# Patient Record
Sex: FEMALE | Race: OTHER | NOT HISPANIC OR LATINO | URBAN - METROPOLITAN AREA
[De-identification: names, ages, dates, MRNs, and addresses within clinical notes are randomized per-mention and may not be internally consistent; named-entity substitution may affect disease eponyms.]

---

## 2023-12-15 ENCOUNTER — APPOINTMENT (OUTPATIENT)
Dept: RADIOLOGY | Facility: HOSPITAL | Age: 63
End: 2023-12-15
Payer: COMMERCIAL

## 2024-05-22 ENCOUNTER — APPOINTMENT (OUTPATIENT)
Dept: RADIOLOGY | Facility: HOSPITAL | Age: 64
End: 2024-05-22
Payer: COMMERCIAL

## 2024-11-28 ENCOUNTER — HOSPITAL ENCOUNTER (INPATIENT)
Facility: HOSPITAL | Age: 64
LOS: 1 days | Discharge: HOME | End: 2024-11-29
Attending: EMERGENCY MEDICINE | Admitting: STUDENT IN AN ORGANIZED HEALTH CARE EDUCATION/TRAINING PROGRAM
Payer: COMMERCIAL

## 2024-11-28 ENCOUNTER — APPOINTMENT (OUTPATIENT)
Dept: CARDIOLOGY | Facility: HOSPITAL | Age: 64
End: 2024-11-28
Payer: COMMERCIAL

## 2024-11-28 DIAGNOSIS — I31.9 PERICARDITIS, UNSPECIFIED CHRONICITY, UNSPECIFIED TYPE (HHS-HCC): ICD-10-CM

## 2024-11-28 DIAGNOSIS — I31.39 PERICARDIAL EFFUSION (HHS-HCC): Primary | ICD-10-CM

## 2024-11-28 DIAGNOSIS — I30.9 ACUTE PERICARDITIS, UNSPECIFIED TYPE (HHS-HCC): ICD-10-CM

## 2024-11-28 LAB
ALBUMIN SERPL BCP-MCNC: 3.7 G/DL (ref 3.4–5)
ALP SERPL-CCNC: 88 U/L (ref 33–136)
ALT SERPL W P-5'-P-CCNC: 7 U/L (ref 7–45)
ANION GAP SERPL CALC-SCNC: 13 MMOL/L (ref 10–20)
AST SERPL W P-5'-P-CCNC: 9 U/L (ref 9–39)
BASOPHILS # BLD AUTO: 0.06 X10*3/UL (ref 0–0.1)
BASOPHILS NFR BLD AUTO: 0.7 %
BILIRUB SERPL-MCNC: 0.4 MG/DL (ref 0–1.2)
BNP SERPL-MCNC: 26 PG/ML (ref 0–99)
BUN SERPL-MCNC: 9 MG/DL (ref 6–23)
CALCIUM SERPL-MCNC: 9.3 MG/DL (ref 8.6–10.3)
CARDIAC TROPONIN I PNL SERPL HS: <3 NG/L (ref 0–13)
CARDIAC TROPONIN I PNL SERPL HS: <3 NG/L (ref 0–13)
CHLORIDE SERPL-SCNC: 102 MMOL/L (ref 98–107)
CO2 SERPL-SCNC: 23 MMOL/L (ref 21–32)
CREAT SERPL-MCNC: 0.56 MG/DL (ref 0.5–1.05)
D DIMER PPP FEU-MCNC: 4969 NG/ML FEU
EGFRCR SERPLBLD CKD-EPI 2021: >90 ML/MIN/1.73M*2
EOSINOPHIL # BLD AUTO: 0.22 X10*3/UL (ref 0–0.7)
EOSINOPHIL NFR BLD AUTO: 2.7 %
ERYTHROCYTE [DISTWIDTH] IN BLOOD BY AUTOMATED COUNT: 18.1 % (ref 11.5–14.5)
GLUCOSE SERPL-MCNC: 97 MG/DL (ref 74–99)
HCT VFR BLD AUTO: 28.1 % (ref 36–46)
HGB BLD-MCNC: 8.3 G/DL (ref 12–16)
IMM GRANULOCYTES # BLD AUTO: 0.04 X10*3/UL (ref 0–0.7)
IMM GRANULOCYTES NFR BLD AUTO: 0.5 % (ref 0–0.9)
INR PPP: 1.5 (ref 0.9–1.1)
LYMPHOCYTES # BLD AUTO: 1.52 X10*3/UL (ref 1.2–4.8)
LYMPHOCYTES NFR BLD AUTO: 18.4 %
MCH RBC QN AUTO: 21.2 PG (ref 26–34)
MCHC RBC AUTO-ENTMCNC: 29.5 G/DL (ref 32–36)
MCV RBC AUTO: 72 FL (ref 80–100)
MONOCYTES # BLD AUTO: 0.47 X10*3/UL (ref 0.1–1)
MONOCYTES NFR BLD AUTO: 5.7 %
NEUTROPHILS # BLD AUTO: 5.94 X10*3/UL (ref 1.2–7.7)
NEUTROPHILS NFR BLD AUTO: 72 %
NRBC BLD-RTO: 0 /100 WBCS (ref 0–0)
PLATELET # BLD AUTO: 370 X10*3/UL (ref 150–450)
POTASSIUM SERPL-SCNC: 3.7 MMOL/L (ref 3.5–5.3)
PROT SERPL-MCNC: 7.8 G/DL (ref 6.4–8.2)
PROTHROMBIN TIME: 16.9 SECONDS (ref 9.8–12.8)
RBC # BLD AUTO: 3.91 X10*6/UL (ref 4–5.2)
SODIUM SERPL-SCNC: 134 MMOL/L (ref 136–145)
WBC # BLD AUTO: 8.3 X10*3/UL (ref 4.4–11.3)

## 2024-11-28 PROCEDURE — 83880 ASSAY OF NATRIURETIC PEPTIDE: CPT | Performed by: EMERGENCY MEDICINE

## 2024-11-28 PROCEDURE — 99291 CRITICAL CARE FIRST HOUR: CPT | Performed by: STUDENT IN AN ORGANIZED HEALTH CARE EDUCATION/TRAINING PROGRAM

## 2024-11-28 PROCEDURE — 36415 COLL VENOUS BLD VENIPUNCTURE: CPT | Performed by: EMERGENCY MEDICINE

## 2024-11-28 PROCEDURE — 93005 ELECTROCARDIOGRAM TRACING: CPT

## 2024-11-28 PROCEDURE — 93010 ELECTROCARDIOGRAM REPORT: CPT | Performed by: EMERGENCY MEDICINE

## 2024-11-28 PROCEDURE — 85610 PROTHROMBIN TIME: CPT | Performed by: EMERGENCY MEDICINE

## 2024-11-28 PROCEDURE — 84484 ASSAY OF TROPONIN QUANT: CPT | Performed by: EMERGENCY MEDICINE

## 2024-11-28 PROCEDURE — 80053 COMPREHEN METABOLIC PANEL: CPT | Performed by: EMERGENCY MEDICINE

## 2024-11-28 PROCEDURE — 85025 COMPLETE CBC W/AUTO DIFF WBC: CPT | Performed by: EMERGENCY MEDICINE

## 2024-11-28 PROCEDURE — 85379 FIBRIN DEGRADATION QUANT: CPT

## 2024-11-28 PROCEDURE — 93308 TTE F-UP OR LMTD: CPT | Performed by: STUDENT IN AN ORGANIZED HEALTH CARE EDUCATION/TRAINING PROGRAM

## 2024-11-28 RX ORDER — DIPHENHYDRAMINE HYDROCHLORIDE 50 MG/ML
50 INJECTION INTRAMUSCULAR; INTRAVENOUS ONCE
Status: COMPLETED | OUTPATIENT
Start: 2024-11-29 | End: 2024-11-29

## 2024-11-28 ASSESSMENT — COLUMBIA-SUICIDE SEVERITY RATING SCALE - C-SSRS
1. IN THE PAST MONTH, HAVE YOU WISHED YOU WERE DEAD OR WISHED YOU COULD GO TO SLEEP AND NOT WAKE UP?: NO
2. HAVE YOU ACTUALLY HAD ANY THOUGHTS OF KILLING YOURSELF?: NO
6. HAVE YOU EVER DONE ANYTHING, STARTED TO DO ANYTHING, OR PREPARED TO DO ANYTHING TO END YOUR LIFE?: NO

## 2024-11-28 ASSESSMENT — PAIN DESCRIPTION - PAIN TYPE: TYPE: ACUTE PAIN

## 2024-11-28 ASSESSMENT — PAIN DESCRIPTION - LOCATION: LOCATION: CHEST

## 2024-11-28 ASSESSMENT — PAIN SCALES - GENERAL: PAINLEVEL_OUTOF10: 10 - WORST POSSIBLE PAIN

## 2024-11-28 ASSESSMENT — PAIN - FUNCTIONAL ASSESSMENT: PAIN_FUNCTIONAL_ASSESSMENT: 0-10

## 2024-11-29 ENCOUNTER — APPOINTMENT (OUTPATIENT)
Dept: RADIOLOGY | Facility: HOSPITAL | Age: 64
End: 2024-11-29
Payer: COMMERCIAL

## 2024-11-29 ENCOUNTER — APPOINTMENT (OUTPATIENT)
Dept: CARDIOLOGY | Facility: HOSPITAL | Age: 64
End: 2024-11-29
Payer: COMMERCIAL

## 2024-11-29 VITALS
TEMPERATURE: 97.7 F | BODY MASS INDEX: 44.65 KG/M2 | DIASTOLIC BLOOD PRESSURE: 82 MMHG | HEART RATE: 86 BPM | HEIGHT: 63 IN | WEIGHT: 252 LBS | OXYGEN SATURATION: 95 % | RESPIRATION RATE: 16 BRPM | SYSTOLIC BLOOD PRESSURE: 144 MMHG

## 2024-11-29 DIAGNOSIS — I31.39 PERICARDIAL EFFUSION (HHS-HCC): Primary | ICD-10-CM

## 2024-11-29 PROBLEM — I31.9 PERICARDITIS (HHS-HCC): Status: ACTIVE | Noted: 2024-11-29

## 2024-11-29 LAB
AORTIC VALVE PEAK VELOCITY: 1.6 M/S
AV PEAK GRADIENT: 10 MMHG
AVA (PEAK VEL): 2.16 CM2
CRP SERPL-MCNC: 13.87 MG/DL
EJECTION FRACTION APICAL 4 CHAMBER: 55.4
EJECTION FRACTION: 60 %
ERYTHROCYTE [DISTWIDTH] IN BLOOD BY AUTOMATED COUNT: 18.1 % (ref 11.5–14.5)
FERRITIN SERPL-MCNC: 55 NG/ML (ref 8–150)
HCT VFR BLD AUTO: 29.9 % (ref 36–46)
HGB BLD-MCNC: 8.7 G/DL (ref 12–16)
IRON SATN MFR SERPL: 4 % (ref 25–45)
IRON SERPL-MCNC: 14 UG/DL (ref 35–150)
LEFT VENTRICLE INTERNAL DIMENSION DIASTOLE: 3.36 CM (ref 3.5–6)
LEFT VENTRICULAR OUTFLOW TRACT DIAMETER: 2 CM
LV EJECTION FRACTION BIPLANE: 54 %
MCH RBC QN AUTO: 21.2 PG (ref 26–34)
MCHC RBC AUTO-ENTMCNC: 29.1 G/DL (ref 32–36)
MCV RBC AUTO: 73 FL (ref 80–100)
MITRAL VALVE E/A RATIO: 0.73
MITRAL VALVE E/E' RATIO: 6.3
NRBC BLD-RTO: 0 /100 WBCS (ref 0–0)
PLATELET # BLD AUTO: 372 X10*3/UL (ref 150–450)
RBC # BLD AUTO: 4.11 X10*6/UL (ref 4–5.2)
RIGHT VENTRICLE FREE WALL PEAK S': 11 CM/S
TIBC SERPL-MCNC: 371 UG/DL (ref 240–445)
TRICUSPID ANNULAR PLANE SYSTOLIC EXCURSION: 2 CM
UIBC SERPL-MCNC: 357 UG/DL (ref 110–370)
WBC # BLD AUTO: 8 X10*3/UL (ref 4.4–11.3)

## 2024-11-29 PROCEDURE — 71275 CT ANGIOGRAPHY CHEST: CPT

## 2024-11-29 PROCEDURE — 86038 ANTINUCLEAR ANTIBODIES: CPT | Mod: STJLAB

## 2024-11-29 PROCEDURE — G0378 HOSPITAL OBSERVATION PER HR: HCPCS

## 2024-11-29 PROCEDURE — 99255 IP/OBS CONSLTJ NEW/EST HI 80: CPT

## 2024-11-29 PROCEDURE — 83550 IRON BINDING TEST: CPT | Performed by: STUDENT IN AN ORGANIZED HEALTH CARE EDUCATION/TRAINING PROGRAM

## 2024-11-29 PROCEDURE — 96376 TX/PRO/DX INJ SAME DRUG ADON: CPT | Mod: 59

## 2024-11-29 PROCEDURE — 93306 TTE W/DOPPLER COMPLETE: CPT | Performed by: INTERNAL MEDICINE

## 2024-11-29 PROCEDURE — 82728 ASSAY OF FERRITIN: CPT | Performed by: STUDENT IN AN ORGANIZED HEALTH CARE EDUCATION/TRAINING PROGRAM

## 2024-11-29 PROCEDURE — 93306 TTE W/DOPPLER COMPLETE: CPT

## 2024-11-29 PROCEDURE — 2060000001 HC INTERMEDIATE ICU ROOM DAILY

## 2024-11-29 PROCEDURE — 36415 COLL VENOUS BLD VENIPUNCTURE: CPT

## 2024-11-29 PROCEDURE — 2500000004 HC RX 250 GENERAL PHARMACY W/ HCPCS (ALT 636 FOR OP/ED)

## 2024-11-29 PROCEDURE — 85027 COMPLETE CBC AUTOMATED: CPT | Performed by: STUDENT IN AN ORGANIZED HEALTH CARE EDUCATION/TRAINING PROGRAM

## 2024-11-29 PROCEDURE — 83540 ASSAY OF IRON: CPT | Performed by: STUDENT IN AN ORGANIZED HEALTH CARE EDUCATION/TRAINING PROGRAM

## 2024-11-29 PROCEDURE — 86140 C-REACTIVE PROTEIN: CPT

## 2024-11-29 PROCEDURE — 96375 TX/PRO/DX INJ NEW DRUG ADDON: CPT

## 2024-11-29 PROCEDURE — 71275 CT ANGIOGRAPHY CHEST: CPT | Performed by: STUDENT IN AN ORGANIZED HEALTH CARE EDUCATION/TRAINING PROGRAM

## 2024-11-29 PROCEDURE — 86235 NUCLEAR ANTIGEN ANTIBODY: CPT

## 2024-11-29 PROCEDURE — 2500000005 HC RX 250 GENERAL PHARMACY W/O HCPCS: Performed by: STUDENT IN AN ORGANIZED HEALTH CARE EDUCATION/TRAINING PROGRAM

## 2024-11-29 PROCEDURE — 2550000001 HC RX 255 CONTRASTS: Performed by: STUDENT IN AN ORGANIZED HEALTH CARE EDUCATION/TRAINING PROGRAM

## 2024-11-29 PROCEDURE — 99236 HOSP IP/OBS SAME DATE HI 85: CPT | Performed by: STUDENT IN AN ORGANIZED HEALTH CARE EDUCATION/TRAINING PROGRAM

## 2024-11-29 PROCEDURE — 99291 CRITICAL CARE FIRST HOUR: CPT | Mod: 25 | Performed by: STUDENT IN AN ORGANIZED HEALTH CARE EDUCATION/TRAINING PROGRAM

## 2024-11-29 PROCEDURE — 96374 THER/PROPH/DIAG INJ IV PUSH: CPT | Mod: 59

## 2024-11-29 RX ORDER — AMLODIPINE BESYLATE 10 MG/1
10 TABLET ORAL DAILY
COMMUNITY

## 2024-11-29 RX ORDER — DOXEPIN HYDROCHLORIDE 50 MG/G
1 CREAM TOPICAL 2 TIMES DAILY
COMMUNITY

## 2024-11-29 RX ORDER — HYDROXYZINE HYDROCHLORIDE 50 MG/1
50 TABLET, FILM COATED ORAL EVERY 8 HOURS PRN
COMMUNITY

## 2024-11-29 RX ORDER — LORAZEPAM 1 MG/1
1 TABLET ORAL EVERY 12 HOURS
COMMUNITY

## 2024-11-29 RX ORDER — CROMOLYN SODIUM 40 MG/ML
1 SOLUTION/ DROPS OPHTHALMIC 4 TIMES DAILY
COMMUNITY

## 2024-11-29 RX ORDER — POTASSIUM CHLORIDE 750 MG/1
10 TABLET, FILM COATED, EXTENDED RELEASE ORAL 2 TIMES DAILY
COMMUNITY

## 2024-11-29 RX ORDER — FERROUS SULFATE, DRIED 160(50) MG
1 TABLET, EXTENDED RELEASE ORAL DAILY
COMMUNITY

## 2024-11-29 RX ORDER — COLCHICINE 0.6 MG/1
0.6 TABLET ORAL 2 TIMES DAILY
Status: DISCONTINUED | OUTPATIENT
Start: 2024-11-29 | End: 2024-11-29 | Stop reason: HOSPADM

## 2024-11-29 RX ORDER — LORATADINE 10 MG/1
10 TABLET ORAL DAILY
COMMUNITY

## 2024-11-29 RX ORDER — FOLIC ACID 1 MG/1
2 TABLET ORAL DAILY
COMMUNITY

## 2024-11-29 RX ORDER — DULOXETIN HYDROCHLORIDE 30 MG/1
30 CAPSULE, DELAYED RELEASE ORAL 2 TIMES DAILY
COMMUNITY

## 2024-11-29 RX ORDER — ACETAMINOPHEN 500 MG
5000 TABLET ORAL DAILY
COMMUNITY

## 2024-11-29 RX ORDER — DIPHENHYDRAMINE HCL 25 MG
25 CAPSULE ORAL EVERY 8 HOURS PRN
COMMUNITY

## 2024-11-29 RX ORDER — MELOXICAM 15 MG/1
15 TABLET ORAL DAILY
COMMUNITY
End: 2024-11-29 | Stop reason: HOSPADM

## 2024-11-29 RX ORDER — POLYETHYLENE GLYCOL 3350 17 G/17G
17 POWDER, FOR SOLUTION ORAL DAILY
Status: DISCONTINUED | OUTPATIENT
Start: 2024-11-29 | End: 2024-11-29 | Stop reason: HOSPADM

## 2024-11-29 RX ORDER — FUROSEMIDE 40 MG/1
40 TABLET ORAL DAILY
COMMUNITY
End: 2024-11-29 | Stop reason: HOSPADM

## 2024-11-29 RX ORDER — COLCHICINE 0.6 MG/1
0.6 TABLET ORAL 2 TIMES DAILY
Qty: 60 TABLET | Refills: 2 | Status: SHIPPED | OUTPATIENT
Start: 2024-11-29 | End: 2025-02-27

## 2024-11-29 RX ORDER — IRON POLYSACCHARIDE COMPLEX 180 MG
1 CAPSULE ORAL DAILY
COMMUNITY

## 2024-11-29 SDOH — SOCIAL STABILITY: SOCIAL INSECURITY: ARE THERE ANY APPARENT SIGNS OF INJURIES/BEHAVIORS THAT COULD BE RELATED TO ABUSE/NEGLECT?: UNABLE TO ASSESS

## 2024-11-29 SDOH — ECONOMIC STABILITY: HOUSING INSECURITY: IN THE PAST 12 MONTHS, HOW MANY TIMES HAVE YOU MOVED WHERE YOU WERE LIVING?: 0

## 2024-11-29 SDOH — SOCIAL STABILITY: SOCIAL INSECURITY: HAS ANYONE EVER THREATENED TO HURT YOUR FAMILY OR YOUR PETS?: UNABLE TO ASSESS

## 2024-11-29 SDOH — SOCIAL STABILITY: SOCIAL INSECURITY: DOES ANYONE TRY TO KEEP YOU FROM HAVING/CONTACTING OTHER FRIENDS OR DOING THINGS OUTSIDE YOUR HOME?: UNABLE TO ASSESS

## 2024-11-29 SDOH — SOCIAL STABILITY: SOCIAL INSECURITY: DO YOU FEEL ANYONE HAS EXPLOITED OR TAKEN ADVANTAGE OF YOU FINANCIALLY OR OF YOUR PERSONAL PROPERTY?: UNABLE TO ASSESS

## 2024-11-29 SDOH — SOCIAL STABILITY: SOCIAL INSECURITY
WITHIN THE LAST YEAR, HAVE YOU BEEN HUMILIATED OR EMOTIONALLY ABUSED IN OTHER WAYS BY YOUR PARTNER OR EX-PARTNER?: PATIENT DECLINED

## 2024-11-29 SDOH — SOCIAL STABILITY: SOCIAL INSECURITY: ABUSE: ADULT

## 2024-11-29 SDOH — ECONOMIC STABILITY: HOUSING INSECURITY: AT ANY TIME IN THE PAST 12 MONTHS, WERE YOU HOMELESS OR LIVING IN A SHELTER (INCLUDING NOW)?: PATIENT DECLINED

## 2024-11-29 SDOH — SOCIAL STABILITY: SOCIAL INSECURITY
WITHIN THE LAST YEAR, HAVE YOU BEEN KICKED, HIT, SLAPPED, OR OTHERWISE PHYSICALLY HURT BY YOUR PARTNER OR EX-PARTNER?: PATIENT DECLINED

## 2024-11-29 SDOH — SOCIAL STABILITY: SOCIAL INSECURITY: DO YOU FEEL UNSAFE GOING BACK TO THE PLACE WHERE YOU ARE LIVING?: UNABLE TO ASSESS

## 2024-11-29 SDOH — ECONOMIC STABILITY: FOOD INSECURITY: HOW HARD IS IT FOR YOU TO PAY FOR THE VERY BASICS LIKE FOOD, HOUSING, MEDICAL CARE, AND HEATING?: PATIENT DECLINED

## 2024-11-29 SDOH — SOCIAL STABILITY: SOCIAL INSECURITY: HAVE YOU HAD THOUGHTS OF HARMING ANYONE ELSE?: NO

## 2024-11-29 SDOH — ECONOMIC STABILITY: TRANSPORTATION INSECURITY
IN THE PAST 12 MONTHS, HAS LACK OF TRANSPORTATION KEPT YOU FROM MEDICAL APPOINTMENTS OR FROM GETTING MEDICATIONS?: PATIENT DECLINED

## 2024-11-29 SDOH — ECONOMIC STABILITY: HOUSING INSECURITY: IN THE LAST 12 MONTHS, WAS THERE A TIME WHEN YOU WERE NOT ABLE TO PAY THE MORTGAGE OR RENT ON TIME?: PATIENT DECLINED

## 2024-11-29 SDOH — SOCIAL STABILITY: SOCIAL INSECURITY: WITHIN THE LAST YEAR, HAVE YOU BEEN AFRAID OF YOUR PARTNER OR EX-PARTNER?: PATIENT DECLINED

## 2024-11-29 SDOH — ECONOMIC STABILITY: FOOD INSECURITY: WITHIN THE PAST 12 MONTHS, THE FOOD YOU BOUGHT JUST DIDN'T LAST AND YOU DIDN'T HAVE MONEY TO GET MORE.: PATIENT DECLINED

## 2024-11-29 SDOH — ECONOMIC STABILITY: INCOME INSECURITY
IN THE PAST 12 MONTHS HAS THE ELECTRIC, GAS, OIL, OR WATER COMPANY THREATENED TO SHUT OFF SERVICES IN YOUR HOME?: PATIENT DECLINED

## 2024-11-29 SDOH — SOCIAL STABILITY: SOCIAL INSECURITY: ARE YOU OR HAVE YOU BEEN THREATENED OR ABUSED PHYSICALLY, EMOTIONALLY, OR SEXUALLY BY ANYONE?: UNABLE TO ASSESS

## 2024-11-29 SDOH — ECONOMIC STABILITY: FOOD INSECURITY
WITHIN THE PAST 12 MONTHS, YOU WORRIED THAT YOUR FOOD WOULD RUN OUT BEFORE YOU GOT THE MONEY TO BUY MORE.: PATIENT DECLINED

## 2024-11-29 SDOH — SOCIAL STABILITY: SOCIAL INSECURITY
WITHIN THE LAST YEAR, HAVE YOU BEEN RAPED OR FORCED TO HAVE ANY KIND OF SEXUAL ACTIVITY BY YOUR PARTNER OR EX-PARTNER?: PATIENT DECLINED

## 2024-11-29 ASSESSMENT — LIFESTYLE VARIABLES
SKIP TO QUESTIONS 9-10: 0
TOTAL SCORE: 0
AUDIT-C TOTAL SCORE: -1
HOW OFTEN DO YOU HAVE 6 OR MORE DRINKS ON ONE OCCASION: PATIENT DECLINED
EVER HAD A DRINK FIRST THING IN THE MORNING TO STEADY YOUR NERVES TO GET RID OF A HANGOVER: NO
EVER FELT BAD OR GUILTY ABOUT YOUR DRINKING: NO
HAVE YOU EVER FELT YOU SHOULD CUT DOWN ON YOUR DRINKING: NO
HOW OFTEN DO YOU HAVE A DRINK CONTAINING ALCOHOL: PATIENT DECLINED
HOW MANY STANDARD DRINKS CONTAINING ALCOHOL DO YOU HAVE ON A TYPICAL DAY: PATIENT DECLINED
HAVE PEOPLE ANNOYED YOU BY CRITICIZING YOUR DRINKING: NO
AUDIT-C TOTAL SCORE: -1

## 2024-11-29 ASSESSMENT — COGNITIVE AND FUNCTIONAL STATUS - GENERAL
WALKING IN HOSPITAL ROOM: A LITTLE
PATIENT BASELINE BEDBOUND: NO
MOBILITY SCORE: 18
DAILY ACTIVITIY SCORE: 24
STANDING UP FROM CHAIR USING ARMS: A LITTLE
CLIMB 3 TO 5 STEPS WITH RAILING: A LITTLE
MOVING FROM LYING ON BACK TO SITTING ON SIDE OF FLAT BED WITH BEDRAILS: A LITTLE
MOVING TO AND FROM BED TO CHAIR: A LITTLE
TURNING FROM BACK TO SIDE WHILE IN FLAT BAD: A LITTLE

## 2024-11-29 ASSESSMENT — PATIENT HEALTH QUESTIONNAIRE - PHQ9
2. FEELING DOWN, DEPRESSED OR HOPELESS: NOT AT ALL
1. LITTLE INTEREST OR PLEASURE IN DOING THINGS: NOT AT ALL
SUM OF ALL RESPONSES TO PHQ9 QUESTIONS 1 & 2: 0

## 2024-11-29 ASSESSMENT — ENCOUNTER SYMPTOMS
NAUSEA: 0
HEMATOLOGIC/LYMPHATIC NEGATIVE: 1
PALPITATIONS: 0
PSYCHIATRIC NEGATIVE: 1
EYES NEGATIVE: 1
ALLERGIC/IMMUNOLOGIC NEGATIVE: 1
SHORTNESS OF BREATH: 1
DIZZINESS: 1
CONSTITUTIONAL NEGATIVE: 1
ENDOCRINE NEGATIVE: 1
VOMITING: 0
GASTROINTESTINAL NEGATIVE: 1
MUSCULOSKELETAL NEGATIVE: 1
ABDOMINAL PAIN: 0

## 2024-11-29 ASSESSMENT — ACTIVITIES OF DAILY LIVING (ADL)
GROOMING: INDEPENDENT
LACK_OF_TRANSPORTATION: PATIENT DECLINED
FEEDING YOURSELF: INDEPENDENT
HEARING - LEFT EAR: FUNCTIONAL
TOILETING: INDEPENDENT
JUDGMENT_ADEQUATE_SAFELY_COMPLETE_DAILY_ACTIVITIES: YES
PATIENT'S MEMORY ADEQUATE TO SAFELY COMPLETE DAILY ACTIVITIES?: YES
DRESSING YOURSELF: INDEPENDENT
BATHING: INDEPENDENT
LACK_OF_TRANSPORTATION: PATIENT DECLINED
HEARING - RIGHT EAR: FUNCTIONAL
WALKS IN HOME: INDEPENDENT
ADEQUATE_TO_COMPLETE_ADL: YES

## 2024-11-29 ASSESSMENT — PAIN SCALES - GENERAL
PAINLEVEL_OUTOF10: 0 - NO PAIN

## 2024-11-29 ASSESSMENT — PAIN - FUNCTIONAL ASSESSMENT: PAIN_FUNCTIONAL_ASSESSMENT: 0-10

## 2024-11-29 NOTE — ED PROCEDURE NOTE
Procedure  Critical Care    Performed by: Michael Paredes MD  Authorized by: Michael Paredes MD    Critical care provider statement:     Critical care time (minutes):  35    Critical care time was exclusive of:  Separately billable procedures and treating other patients and teaching time    Critical care was necessary to treat or prevent imminent or life-threatening deterioration of the following conditions:  Cardiac failure    Critical care was time spent personally by me on the following activities:  Development of treatment plan with patient or surrogate, discussions with consultants, evaluation of patient's response to treatment, interpretation of cardiac output measurements, ordering and performing treatments and interventions, ordering and review of laboratory studies, ordering and review of radiographic studies, pulse oximetry and re-evaluation of patient's condition    Care discussed with: admitting provider                 Michael Paredes MD  11/29/24 0599

## 2024-11-29 NOTE — ED TRIAGE NOTES
Patient has had chest pain for a couple days now. She woke up with left shoulder pain and left hand swelling. She states she took 2 of her daughters Coumadin due to the chest pain. Pt is not prescribed Coumadin.

## 2024-11-29 NOTE — HOSPITAL COURSE
64-year-old female with PMHx of RA, lupus, CAD, distant history of cardiac cath/PCI, VLAD, MDD, nicotine use disorder, GERD, HTN, obesity, meningioma s/p surgical resection in August 2024 at the [facility] who presented to [facility]'s ED complaining of exertional chest pain, dyspnea for the last 4 to 5 days.  Patient did not have any formal diagnosis of COPD but she is a daily smoker.    Of note, she went to the [facility] ED on 9/22/2024 complaining of bilateral lower extremity swelling which started after her meningioma resection R>L as well as significant insomnia after the operation    ED course:   VS: /77, HR 98, RR 20, afebrile 37, RR 20, SpO2 94% on RA.  She became hypoxic tachypneic afterwards to the mid 80s and was placed on O2 via NC.  Labs: CBC with Hgb 8.3, HCT 28.1, MCV 72, no leukocytosis.  Chemistry is unremarkable except for sodium 134.  Negative troponin, normal BNP 26.  Elevated D-dimer 4969.  INR 1.5, PT 16.9.  Imaging: CT angio chest revealed no PE but large pericardial effusion concerning for early cardiac tamponade, aneurysmal ascending thoracic aorta measuring up to 3.4 cm, atelectatic changes without consolidation (CT chest took a long time considering patient has contrast allergy).  POC ultrasound did not show clear evidence of cardiac tamponade such as diastolic right ventricular collapse or systolic right atrial collapse as well as patient was not hypotensive.    Cardiology was consulted and recommended a stat echocardiogram and if there is a tamponade, interventional cardiology need to be consulted for either pericardiocentesis or pericardial window.  Clear instructions of not giving Lasix under any circumstances.    Assessment and plan:    # Exertional chest pain and dyspnea with large pericardial effusion  # Unconfirmed history of SLE and RA  # Chronic microcytic anemia  # Postoperative bilateral lower extremity swelling  # Nicotine use disorder  # Hypertension  # Obesity  #  Contrast allergy    Likely contributing factors include lupus associated serositis, possible postoperative inflammatory response or progression of CAD.  No clear evidence of tamponade currently, thus the stat echocardiogram.  Risk of overlapping autoimmune driven serositis or vasculitis in case of SLE and RA.  As far as her anemia is likely iron deficiency exacerbated by chronic disease and possible blood loss?  For the bilateral lower extremity swelling, the differential include postsurgical venous insufficiency versus lymphedema versus DVT (negative PE on CT.  Also chronic smoking history is definitely contributing to dyspnea and atelectasis.  HTN contributes to increased cardiovascular risk.    Plan:    -Admitted to stepdown on telemetry  -Monitor vitally and hemodynamically especially signs for tamponade (hypotension, pulses paradoxus, JVD)  -Cardiology consulted and aware of the situation  -Follow-up stat echocardiogram  -Repeat EKG to evaluate for electrical alternate or other abnormalities  -In case of tamponade, consult interventional cardiology for pericardiocentesis or surgical pericardial window  -Avoid diuretics (risk of hemodynamic compromise) especially Lasix  -Consider autoimmune flare versus infection if SLE related thyroiditis suspected and consider sending JOVANNA, dsDNA, complements level C3/C4, inflammatory markers CRP/ESR.  -Consider iron studies for anemia  -For bilateral lower extremity edema, please consider Doppler ultrasound  -No pharmaceutical DVT PPx considering possible intervention in case of tamponade.  Please revisit if no intervention or if DVT is suspected.  Currently on SCDs  -NRT    # For VLAD/MDD  Continue home medication once med rec is done.

## 2024-11-29 NOTE — PROGRESS NOTES
Please schedule outpatient limited pericardial effusion in 2-3 weeks.  RTO with me preferably same day.    SDH

## 2024-11-29 NOTE — DISCHARGE INSTRUCTIONS
Please follow up with your primary care provider within 7 days for hospital follow up. Please call to make this appointment.    Please follow-up with cardiology Dr. Arechiga regarding repeating echocardiogram within 2 to 4 weeks    Please take your medications as prescribed.     Please take colchicine twice daily for 3 months    If you have any new or worsening symptoms seek medical attention.    Thank you for allowing us to participate in your care!    -[facility] [place] [facility].

## 2024-11-29 NOTE — PROGRESS NOTES
11/29/24 1232   Discharge Planning   Living Arrangements Children  (Lives with 2 daughters)   Support Systems Children;Family members   Assistance Needed uses a cane to ambulate   Type of Residence Private residence   Who is requesting discharge planning? Provider   Home or Post Acute Services None   Expected Discharge Disposition Home   Does the patient need discharge transport arranged? No   Intensity of Service   Intensity of Service 0-30 min     Called and spoke with patient's son, Vi. Introduced self and role in hospital. Verified address and PCP is Dr. Venus Herring. Uses [facility] in [place] for medications and has no issues obtaining them. Uses a cane to ambulate, no driving, and is mostly independent with ADL's. Son states that on good days, patient does well ambulating but has very bad arthritis especially in knee. Patient will return home on discharge with no needs. CT team to follow patient for discharge needs.

## 2024-11-29 NOTE — H&P
Internal Medicine    Admission H&P      Patient Marylou Cordero PCP Mily Bassett MD    MRN [id number]  Admission Date 11/28/2024      Chief Complaint/Reason for Admission:  Marylou is a 64 y.o. female who presented today with chest pain and exertional dyspnea.    Subjective    Subjective   History of Presenting Illness  Marylou is a 64-year-old female with PMHx of RA, lupus, CAD, distant history of cardiac cath/PCI, VLAD, MDD, nicotine use disorder, GERD, HTN, obesity, meningioma s/p surgical resection in August 2024 at the [facility] who presented to [facility]'s ED complaining of chest pain and exertional dyspnea for the last 4 to 5 days.  History was taken from the patient and her son at bedside.  Patient reported that the exertional dyspnea in addition to difficulty when sleeping flat has been presents with loss 2 to 3 weeks which was also accompanied by dry cough.  And over the last couple days she started having sharp stabbing chest pain that exaggerates with breathing with no specific factors that makes it better. The patient son also reported that she had multiple episodes of choking while asleep.  Patient reported that over the same duration she started noticing swelling in her lower extremities as well as upper extremities.  Patient did not have any formal diagnosis of COPD but she is a daily smoker.  Otherwise, patient denied having fever, chills, abdominal pain, recent change in bowel habits, or urinary symptoms.  However she does endorse having some shortness of breath with chest pain that improved compared to initial presentation.  She does also report having some dizziness.    Of note, she went to the [facility] ED on 9/22/2024 complaining of bilateral lower extremity swelling which started after her meningioma resection R>L as well as significant insomnia after the operation    ED course:   VS: /77, HR 98, RR 20, afebrile 37, RR 20, SpO2 94% on RA.  She became hypoxic tachypneic afterwards to  the mid 80s and was placed on O2 via NC.  Labs: CBC with Hgb 8.3, HCT 28.1, MCV 72, no leukocytosis.  Chemistry is unremarkable except for sodium 134.  Negative troponin, normal BNP 26.  Elevated D-dimer 4969.  INR 1.5, PT 16.9.  Imaging: CT angio chest revealed no PE but large pericardial effusion concerning for early cardiac tamponade, aneurysmal ascending thoracic aorta measuring up to 4.3 cm, atelectatic changes without consolidation (CT chest took a long time considering patient has contrast allergy).  Intervention: POC ultrasound did not show clear evidence of cardiac tamponade such as diastolic right ventricular collapse or systolic right atrial collapse as well as patient was not hypotensive.    Cardiology was consulted and recommended a stat echocardiogram and if there is a tamponade, interventional cardiology need to be consulted for either pericardiocentesis or pericardial window.  Clear instructions of not giving Lasix under any circumstances.    Past Medical History  Active Ambulatory Problems     Diagnosis Date Noted    No Active Ambulatory Problems     Resolved Ambulatory Problems     Diagnosis Date Noted    No Resolved Ambulatory Problems     No Additional Past Medical History       Past Surgical History   Past Surgical History:   Procedure Laterality Date    OTHER SURGICAL HISTORY  01/12/2016    Incision Of Perianal Abscess      Social History  Social History     Socioeconomic History    Marital status:      Spouse name: Not on file    Number of children: Not on file    Years of education: Not on file    Highest education level: Not on file   Occupational History    Not on file   Tobacco Use    Smoking status: Every Day     Types: Cigarettes    Smokeless tobacco: Never   Substance and Sexual Activity    Alcohol use: Not on file    Drug use: Never    Sexual activity: Not on file   Other Topics Concern    Not on file   Social History Narrative    Not on file     Social Drivers of Health      Financial Resource Strain: Patient Declined (11/29/2024)    Overall Financial Resource Strain (CARDIA)     Difficulty of Paying Living Expenses: Patient declined   Food Insecurity: Patient Declined (11/29/2024)    Hunger Vital Sign     Worried About Running Out of Food in the Last Year: Patient declined     Ran Out of Food in the Last Year: Patient declined   Transportation Needs: Patient Declined (11/29/2024)    PRAPARE - Transportation     Lack of Transportation (Medical): Patient declined     Lack of Transportation (Non-Medical): Patient declined   Physical Activity: Not on file   Stress: Not on file   Social Connections: Not on file   Intimate Partner Violence: Patient Declined (11/29/2024)    Humiliation, Afraid, Rape, and Kick questionnaire     Fear of Current or Ex-Partner: Patient declined     Emotionally Abused: Patient declined     Physically Abused: Patient declined     Sexually Abused: Patient declined   Housing Stability: Patient Declined (11/29/2024)    Housing Stability Vital Sign     Unable to Pay for Housing in the Last Year: Patient declined     Number of Times Moved in the Last Year: 0     Homeless in the Last Year: Patient declined       Home Medication:  Prior to Admission medications    Medication Sig Start Date End Date Taking? Authorizing Provider   amLODIPine (Norvasc) 10 mg tablet Take 1 tablet (10 mg) by mouth once daily.   Yes Historical Provider, MD   calcium carbonate-vitamin D3 500 mg-5 mcg (200 unit) tablet Take 1 tablet by mouth once daily.   Yes Historical Provider, MD   cholecalciferol (Vitamin D-3) 5,000 Units tablet Take 1 tablet (5,000 Units) by mouth once daily.   Yes Historical Provider, MD   cromolyn (Opticrom) 4 % ophthalmic solution Administer 1 drop into both eyes 4 times a day.   Yes Historical Provider, MD   diphenhydrAMINE (BENADryl) 25 mg capsule Take 1 capsule (25 mg) by mouth every 8 hours if needed for itching.   Yes Historical Provider, MD   doxepin (Zonalon) 5  % cream Apply 1 Application topically 2 times a day. To face   Yes Historical Provider, MD   DULoxetine (Cymbalta) 30 mg DR capsule Take 1 capsule (30 mg) by mouth 2 times a day.   Yes Historical Provider, MD   folic acid (Folvite) 1 mg tablet Take 2 tablets (2 mg) by mouth once daily.   Yes Historical Provider, MD   furosemide (Lasix) 40 mg tablet Take 1 tablet (40 mg) by mouth once daily.   Yes Historical Provider, MD   hydrOXYzine HCL (Atarax) 50 mg tablet Take 1 tablet (50 mg) by mouth every 8 hours if needed for itching.   Yes Historical Provider, MD   loratadine (Claritin) 10 mg tablet Take 1 tablet (10 mg) by mouth once daily.   Yes Historical Provider, MD   LORazepam (Ativan) 1 mg tablet Take 1 tablet (1 mg) by mouth every 12 hours.   Yes Historical Provider, MD   lubricating eye drops ophthalmic solution Administer 1 drop into both eyes if needed for dry eyes.   Yes Historical Provider, MD   meloxicam (Mobic) 15 mg tablet Take 1 tablet (15 mg) by mouth once daily.   Yes Historical Provider, MD   polysaccharide iron complex (Pro Fe) 180 mg iron capsule Take 1 capsule (391.3 mg) by mouth once daily.   Yes Historical Provider, MD   potassium chloride CR 10 mEq ER tablet Take 1 tablet (10 mEq) by mouth 2 times a day.   Yes Historical Provider, MD   colchicine 0.6 mg tablet Take 1 tablet (0.6 mg) by mouth 2 times a day. 11/29/24 2/27/25  Herminia Lacey, DO        Family History  No family history on file.       Allergies:  Allergies   Allergen Reactions    Iodinated Contrast Media Itching        Review of System:  Review of Systems   Constitutional: Negative.    HENT: Negative.     Eyes: Negative.    Respiratory:  Positive for shortness of breath.    Cardiovascular:  Positive for chest pain and leg swelling. Negative for palpitations.   Gastrointestinal: Negative.  Negative for abdominal pain, nausea and vomiting.   Endocrine: Negative.    Genitourinary: Negative.    Musculoskeletal: Negative.    Skin:  "Negative.    Allergic/Immunologic: Negative.    Neurological:  Positive for dizziness.   Hematological: Negative.    Psychiatric/Behavioral: Negative.          Objective    Objective   Vital Signs:  Visit Vitals  BP (!) 144/96 (BP Location: Right arm, Patient Position: Lying)   Pulse 96   Temp 36.5 °C (97.7 °F) (Temporal)   Resp 20   Ht 1.6 m (5' 3\")   Wt 114 kg (252 lb)   SpO2 95%   BMI 44.64 kg/m²   Smoking Status Every Day   BSA 2.25 m²        Physical Examination:    Constitutional: A&Ox4, NAD, resting comfortable   Head and Face: Atraumatic, normocephalic   Eyes: Normal external exam, EOMI  ENT: Normal external inspection of ears and nose. Oropharynx normal.  Cardiovascular: RRR, S1/S2, no murmurs, rubs, or gallops, radial pulses +2  Pulmonary: CTAB, no respiratory distress, no wheezing, rales or rhonchi, on RA  Abdomen: +BS, soft, non-tender, nondistended, no guarding rigidity or rebound tenderness, no masses noted  MSK: Lower extremity swelling and pitting edema 1+, No joint swelling, normal movements of all extremities.   Neuro: No focal deficits, normal motor function, normal sensation, follows all commands  Skin- No lesions, contusions, or erythema.  Psychiatric: Judgment intact. Appropriate mood, affect and behavior    Laboratory Data:    Results from last 7 days   Lab Units 11/29/24  0852 11/28/24  2131   WBC AUTO x10*3/uL 8.0 8.3   RBC AUTO x10*6/uL 4.11 3.91*   HEMOGLOBIN g/dL 8.7* 8.3*     Results from last 7 days   Lab Units 11/28/24  2131   SODIUM mmol/L 134*   POTASSIUM mmol/L 3.7   CHLORIDE mmol/L 102   CO2 mmol/L 23   BUN mg/dL 9   CREATININE mg/dL 0.56   CALCIUM mg/dL 9.3   BILIRUBIN TOTAL mg/dL 0.4   ALT U/L 7   AST U/L 9       Imaging:  Transthoracic Echo (TTE) Complete    Result Date: 11/29/2024            [facility] [id number] [facility], [facility] [address]    Tel [phone] Fax [phone] TRANSTHORACIC ECHOCARDIOGRAM REPORT Patient Name:       LIZA GARNETT       Reading " Physician:    [id number] Jose Sandra MD Study Date:         11/29/2024           Ordering Provider:    [id number] RAFA SMITH MRN/PID:            [id number]             Fellow: Accession#:         [id number]         Nurse: Date of Birth/Age:  1960 / 64 years Sonographer:          Kristen Hinton RDCS Gender Assigned at  F                    Additional Staff: Birth: Height:             160.02 cm            Admit Date: Weight:             114.31 kg            Admission Status:     Inpatient -                                                                Routine BSA / BMI:          2.13 m2 / 44.64      Department Location:  [facility] CCU [place]                     kg/m2 Blood Pressure: 123 /61 mmHg Study Type:    TRANSTHORACIC ECHO (TTE) COMPLETE Diagnosis/ICD: Other pericardial effusion (noninflammatory)-I31.39 Indication:    large pericardial effusion, evaluate for evidence of tamponade CPT Codes:     Echo Complete w Full Doppler-[id number]  Study Detail: The following Echo studies were performed: 2D, M-Mode, Doppler and               color flow. Technically challenging study due to patient lying in               supine position and body habitus. The patient was asleep.  PHYSICIAN INTERPRETATION: Left Ventricle: The left ventricular systolic function is normal, with a visually estimated ejection fraction of 60%. There are no regional wall motion abnormalities. The left ventricular cavity size is normal. There is mild increased septal and mildly increased posterior left ventricular wall thickness. There is left ventricular concentric remodeling. Spectral Doppler shows a Grade I (impaired relaxation pattern) of left ventricular diastolic filling with normal left atrial filling pressure. Left Atrium: The left atrium is normal in size. Right  Ventricle: The right ventricle is normal in size. There is normal right ventricular global systolic function. Right Atrium: The right atrium is normal in size. Aortic Valve: The aortic valve is trileaflet. There is no evidence of aortic valve regurgitation. The peak instantaneous gradient of the aortic valve is 10 mmHg. Mitral Valve: The mitral valve is normal in structure. There is no evidence of mitral valve regurgitation. Tricuspid Valve: The tricuspid valve is structurally normal. No evidence of tricuspid regurgitation. The right ventricular systolic pressure is unable to be estimated. Pulmonic Valve: The pulmonic valve is structurally normal. There is no indication of pulmonic valve regurgitation. Pericardium: Small to moderate pericardial effusion. The effusion is circumferential. There is no evidence of cardiac tamponade. Aorta: The aortic root is normal. Systemic Veins: The inferior vena cava appears mildly dilated.  CONCLUSIONS:  1. The left ventricular systolic function is normal, with a visually estimated ejection fraction of 60%.  2. Spectral Doppler shows a Grade I (impaired relaxation pattern) of left ventricular diastolic filling with normal left atrial filling pressure.  3. There is normal right ventricular global systolic function.  4. Small to moderate pericardial effusion.  5. There is no evidence of cardiac tamponade.  6. The inferior vena cava appears mildly dilated. QUANTITATIVE DATA SUMMARY:  2D MEASUREMENTS:           Normal Ranges: LAs:             3.60 cm   (2.7-4.0cm) IVSd:            1.19 cm   (0.6-1.1cm) LVPWd:           1.11 cm   (0.6-1.1cm) LVIDd:           3.36 cm   (3.9-5.9cm) LVIDs:           2.43 cm LV Mass Index:   56.2 g/m2 LV % FS          27.7 %  LA VOLUME:                   Normal Ranges: LA Area A4C:      19.9 cm2 LA Area A2C:      18.4 cm2 LA Volume Index:  24.0 ml/m2 LA Vol A4C:       51.0 ml LA Vol A2C:       48.0 ml LA Vol Index BSA: 23.2 ml/m2 LA Vol BP:        52.0 ml   M-MODE MEASUREMENTS:         Normal Ranges: Ao Root:             3.30 cm (2.0-3.7cm) AoV Exc:             1.80 cm (1.5-2.5cm)  AORTA MEASUREMENTS:         Normal Ranges: AoV Exc:            1.80 cm (1.5-2.5cm) Ao Sinus, d:        3.90 cm (2.1-3.5cm) Ao STJ, d:          3.60 cm (1.7-3.4cm) Asc Ao, d:          4.55 cm (2.1-3.4cm)  LV SYSTOLIC FUNCTION BY 2D PLANIMETRY (MOD):                      Normal Ranges: EF-A4C View:    55 % (>=55%) EF-A2C View:    56 % EF-Biplane:     54 % EF-Visual:      60 % LV EF Reported: 60 %  LV DIASTOLIC FUNCTION:            Normal Ranges: MV Peak E:             0.95 m/s   (0.7-1.2 m/s) MV Peak A:             1.30 m/s   (0.42-0.7 m/s) E/A Ratio:             0.73       (1.0-2.2) MV e'                  0.125 m/s  (>8.0) MV lateral e'          0.15 m/s MV medial e'           0.10 m/s E/e' Ratio:            7.58       (<8.0) PulmV Sys Jann:         49.40 cm/s PulmV Baig Jann:        42.40 cm/s PulmV S/D Jann:         1.20  MITRAL VALVE:          Normal Ranges: MV DT:        176 msec (150-240msec)  AORTIC VALVE:            Normal Ranges: AoV Vmax:      1.60 m/s  (<=1.7m/s) AoV Peak PG:   10.2 mmHg (<20mmHg) LVOT Max Jann:  1.10 m/s  (<=1.1m/s) LVOT Diameter: 2.00 cm   (1.8-2.4cm) AoV Area,Vmax: 2.16 cm2  (2.5-4.5cm2)  RIGHT VENTRICLE: RV Basal 2.80 cm RV Mid   2.60 cm RV Major 7.6 cm TAPSE:   19.9 mm RV s'    0.11 m/s  Pulmonary Veins: PulmV Baig Jann: 42.40 cm/s PulmV S/D Jann:  1.20 PulmV Sys Jann:  49.40 cm/s  [id number] Jose Sandra MD Electronically signed on 11/29/2024 at 8:46:38 AM  ** Final **     ECG 12 lead    Result Date: 11/29/2024  Normal sinus rhythm Possible Left atrial enlargement Low voltage QRS Cannot rule out Anteroseptal infarct (cited on or before 28-NOV-2024) Abnormal ECG When compared with ECG of 28-NOV-2024 21:22, (unconfirmed) Criteria for Inferior infarct are no longer Present    ECG 12 lead    Result Date: 11/29/2024  Normal sinus rhythm Possible Left atrial enlargement Low  voltage QRS Inferior infarct , age undetermined Cannot rule out Anteroseptal infarct (cited on or before 28-NOV-2024) Abnormal ECG When compared with ECG of 03-OCT-2022 19:13, Questionable change in initial forces of Septal leads Nonspecific T wave abnormality now evident in Inferior leads Nonspecific T wave abnormality, worse in Anterior leads    CT angio chest for pulmonary embolism    Result Date: 11/29/2024  Interpreted By:  Long Cotter, STUDY: CT ANGIO CHEST FOR PULMONARY EMBOLISM;  11/29/2024 4:11 am   INDICATION: Signs/Symptoms:CP, SOB, elevated dimer.     COMPARISON: CT chest dated 10/03/2022   ACCESSION NUMBER(S): [id number]   ORDERING CLINICIAN: DAYTON MENDOZA   TECHNIQUE: Helical data acquisition of the chest was obtained after intravenous administration of 70 mL Omnipaque 350, as per PE protocol. Images were reformatted in coronal and sagittal planes. Axial and coronal maximum intensity projection (MIP) images were created and reviewed.   FINDINGS: POTENTIAL LIMITATIONS OF THE STUDY: Exam is slightly degraded by beam hardening artifact from the contrast bolus in the left subclavian vein as well as some motion.   HEART AND VESSELS: There are no discrete filling defects within main pulmonary artery and its branches to suggest acute pulmonary embolism. Main pulmonary artery and its branches are normal in caliber.   Ascending thoracic aorta is mildly aneurysmal, measuring up to 4.3 cm in diameter.Minimal vascular calcifications are present in the thoracic aorta.Although, the study is not tailored for evaluation of aorta, there is no definite evidence of acute aortic pathology. No coronary artery calcifications are seen. Please note, the study is not optimized for evaluation of coronary arteries.   The cardiac chambers are not enlarged.There are no findings to suggest right heart strain. Large pericardial effusion is present with some effacement of the cardiac chambers, concerning for early  tamponade.   MEDIASTINUM AND YOVANA, LOWER NECK AND AXILLA: The visualized thyroid gland is within normal limits. No evidence of thoracic lymphadenopathy by CT criteria. Esophagus appears within normal limits as seen.   LUNGS AND AIRWAYS: Trachea and central airways are patent without evidence of the endobronchial lesion.   Atelectatic changes are present in the lingula and lower lobes bilaterally without evidence of consolidation or pleural effusion.   UPPER ABDOMEN: No acute abnormality is identified within the visualized subdiaphragmatic structures. A large cyst is partially visualized in the left kidney.   CHEST WALL AND OSSEOUS STRUCTURES: Chest wall is within normal limits. No acute osseous pathology.There are no suspicious osseous lesions.No acute abnormality is present in the thoracic spine. Multilevel degenerative changes are present in the lower thoracic spine with intervertebral disc height loss.       1. Large pericardial effusion is present, with some effacement of the cardiac chamber is, concerning for early cardiac tamponade. Echocardiogram is recommended for further assessment of the cardiac function. 2. No evidence of pulmonary embolism through the segmental level within limits of mild motion. 3. Aneurysmal ascending thoracic aorta, measuring up to 4.3 cm in diameter. 4. Atelectatic changes are present in the lingula and lower lobes bilaterally without evidence of consolidation or other acute pulmonary abnormality.   MACRO: Long Cotter discussed the significance and urgency of this critical finding by epic secure chat with  CHARLY VALLEJO on 11/29/2024 at 4:19 am.  (**-RCF-**) Findings:  See findings.   Signed by: Long Cotter 11/29/2024 4:19 AM Dictation workstation:   [id number]    Point of Care Ultrasound    Result Date: 11/28/2024  Michael Paredes MD     11/29/2024  5:43 AM Performed by: Michael Paredes MD Authorized by: Michael Paredes MD  Cardiac Indications: chest pain  Procedure: Cardiac Ultrasound Findings:  Views: parasternal long, parasternal short, apical four and subxiphoid Effusion: The pericardial space was visualized and was positive for a PERICARDIAL EFFUSION. Activity: Ventricular contractions were visualized. LV: LV systolic function was NORMAL. RV: RV size was NORMAL. Impression: Cardiac: The focused cardiac ultrasound exam had ABNORMAL findings as specified. Comments: No evidence of right ventricular or atrial collapse or definitive evidence of tamponade      Medications:  Scheduled medications  colchicine, 0.6 mg, oral, BID  oxygen, , inhalation, Continuous - Inhalation  perflutren lipid microspheres, 0.5-10 mL of dilution, intravenous, Once in imaging  perflutren protein A microsphere, 0.5 mL, intravenous, Once in imaging  polyethylene glycol, 17 g, oral, Daily  sulfur hexafluoride microsphr, 2 mL, intravenous, Once in imaging      Continuous medications     PRN medications      Assessment    Assessment & Plan   Ms. Marylou Cordero is a 64 y.o. female PMHx of RA, lupus, CAD, distant history of cardiac cath/PCI, VLAD, MDD, nicotine use disorder, GERD, HTN, obesity, meningioma s/p surgical resection in August 2024 at the [facility] who presented to [facility] ED complaining of chest pain and exertional dyspnea for the last 4 to 5 days.  Given the patient history of rheumatoid arthritis and systemic lupus, the current presentation could be due to autoimmune pericarditis.  This is less likely to be myocardial infarction given the negative troponin and atypical chest description (stabbing chest pain).    Principal Problem:    Pericardial effusion (HHS-HCC)  Active Problems:    Pericarditis (HHS-HCC)       # Exertional chest pain and dyspnea with large pericardial effusion  # Unconfirmed history of SLE and RA  # Chronic microcytic anemia  # Postoperative bilateral lower extremity swelling  # Nicotine use disorder  # Hypertension  # Obesity  # Contrast allergy    Likely  contributing factors include lupus associated serositis, possible postoperative inflammatory response or progression of CAD.  No clear evidence of tamponade currently, thus the stat echocardiogram.  Risk of overlapping autoimmune driven serositis or vasculitis in case of SLE and RA.  As far as her anemia is likely iron deficiency exacerbated by chronic disease and possible blood loss?  For the bilateral lower extremity swelling, the differential include postsurgical venous insufficiency versus lymphedema versus DVT (negative PE on CT.  Also chronic smoking history is definitely contributing to dyspnea and atelectasis.  HTN contributes to increased cardiovascular risk.    Plan:    -Admitted to stepdown on telemetry  -Monitor vitally and hemodynamically especially signs for tamponade (hypotension, pulses paradoxus, JVD)  -Cardiology consulted.  Appreciate recommendations  -Echocardiogram was ordered which showed normal left ventricular systolic function (LVEF 60%) with grade 1 impaired relaxation pattern of the left ventricular.  Additionally showed a small to moderate pericardial effusion with no evidence of cardiac tamponade.  -Repeat EKG to evaluate for electrical alternate or other abnormalities.  Repeated EKG was not significantly changed from previously.  -Avoid diuretics (risk of hemodynamic compromise) especially Lasix  -Due to suspected autoimmune elements CRP, and JOVANNA with reflex JERE were ordered.  CRP was elevated 13.87  -Iron studies showed decrease in iron and percent saturation with normal TIBC.  This could be in the setting of iron deficiency anemia for which the patient is taking supplemental outpatient.  Will hold on doing any further intervention at this time.  -No pharmaceutical DVT PPx considering possible intervention in case of tamponade.  Please revisit if no intervention or if DVT is suspected.  Currently on SCDs  -NRT  -Given the patient improvement in symptoms and after evaluation by  cardiology patient will be discharged home with colchicine for 3 months.        CHRONIC PROBLEMS:  # For VLAD/MDD  Continue home medication once med rec is done.          Global Plan of Care  Fluid: PRN  Electrolytes: PRN  Nutrition:   Cardiac  DVT Prophylaxis:  SCD  GI Prophylaxis:  Code Status: Full Code     Emergency Contact: Extended Emergency Contact Information  Primary Emergency Contact: YOHAN GARNETT  Mobile Phone: [phone]  Relation: Son  Preferred language: English   needed? No  Secondary Emergency Contact: YariVi  Home Phone: [phone]  Relation: Child    Patient seen and discussed with the attending.   Signature: KAJAL RDZ MD  Date: November 29, 2024  This note has been transcribed using Dragon voice recognition system and there is a possibility of unintentional typing misprints.  Any information found to be copied from previous providers is done in the best interest of the patient to provide accurate, quality, and continuity of care.

## 2024-11-29 NOTE — ED PROVIDER NOTES
EMERGENCY DEPARTMENT ENCOUNTER      Pt Name: Marylou Cordero  MRN: [id number]  Birthdate 1960  Date of evaluation: 11/28/2024  Provider: Musa Ling DO    CHIEF COMPLAINT       Chief Complaint   Patient presents with    Chest Pain     Patient started having chest pain for 4-5 days. She woke up with edema In her left hand. She took 2 of her daughters Coumadin         HISTORY OF PRESENT ILLNESS    HPI    64-year-old female daily smoker with past medical history of rheumatoid arthritis and lupus, coronary artery disease with distant history of cardiac cath presenting to the emergency department for evaluation of sharp substernal chest pain radiating to the neck and left arm over the past 5 days which worsens on exertion and with associated exertional shortness of breath but not orthopnea.  Patient states she does not have a formal diagnosis of COPD.  States she has never had chest pain like this before.  States she has chronic pain and swelling in her legs due to her arthritis.  Has had a cough in the past 2 weeks but no fevers, chills, night sweats or rhinorrhea.  No heart palpitations or lightheadedness.    Nursing Notes were reviewed.    PAST MEDICAL HISTORY   History reviewed. No pertinent past medical history.      SURGICAL HISTORY       Past Surgical History:   Procedure Laterality Date    OTHER SURGICAL HISTORY  01/12/2016    Incision Of Perianal Abscess         CURRENT MEDICATIONS       There are no discharge medications for this patient.      ALLERGIES     Iodinated contrast media    FAMILY HISTORY     No family history on file.       SOCIAL HISTORY       Social History     Socioeconomic History    Marital status:    Tobacco Use    Smoking status: Every Day     Types: Cigarettes    Smokeless tobacco: Never   Substance and Sexual Activity    Drug use: Never     Social Drivers of Health     Financial Resource Strain: Low Risk  (10/10/2023)    [facility]     Overall Financial Resource Strain (CARDIA)     Difficulty of Paying Living Expenses: Not hard at all   Food Insecurity: No Food Insecurity (10/10/2023)    Received from [facility]    Hunger Vital Sign     Worried About Running Out of Food in the Last Year: Never true     Ran Out of Food in the Last Year: Never true   Transportation Needs: No Transportation Needs (10/10/2023)    Received from [facility]    PRAPARE - Transportation     Lack of Transportation (Medical): No     Lack of Transportation (Non-Medical): No   Housing Stability: Unknown (10/10/2023)    Received from [facility]    Housing Stability Vital Sign     Unable to Pay for Housing in the Last Year: No     Unstable Housing in the Last Year: No       SCREENINGS                        PHYSICAL EXAM    (up to 7 for level 4, 8 or more for level 5)     ED Triage Vitals [11/28/24 2112]   Temperature Heart Rate Respirations BP   37 °C (98.6 °F) 98 20 119/77      Pulse Ox Temp Source Heart Rate Source Patient Position   94 % Temporal -- Sitting      BP Location FiO2 (%)     Right arm --       Physical Exam  Vitals reviewed.   Constitutional:       General: She is not in acute distress.     Appearance: She is obese. She is not ill-appearing, toxic-appearing or diaphoretic.   HENT:      Head: Normocephalic and atraumatic.   Neck:      Thyroid: No thyromegaly.      Vascular: No JVD.      Trachea: No tracheal deviation.   Cardiovascular:      Rate and Rhythm: Regular rhythm. Tachycardia present.      Pulses:           Radial pulses are 2+ on the right side and 2+ on the left side.      Heart sounds: Normal heart sounds.   Pulmonary:      Effort: Pulmonary effort is normal. No tachypnea, accessory muscle usage or respiratory distress.      Breath sounds: Normal breath sounds. No stridor.   Abdominal:      Palpations: Abdomen is soft. There is no mass.      Tenderness: There is no abdominal  tenderness. There is no guarding or rebound.   Musculoskeletal:      Cervical back: Normal range of motion and neck supple.      Right lower leg: No tenderness. No edema.      Left lower leg: No tenderness. No edema.   Skin:     General: Skin is warm and dry.   Neurological:      General: No focal deficit present.      Mental Status: She is alert and oriented to person, place, and time.   Psychiatric:         Mood and Affect: Mood normal.         Behavior: Behavior normal.          DIAGNOSTIC RESULTS     LABS:  Labs Reviewed   CBC WITH AUTO DIFFERENTIAL - Abnormal       Result Value    WBC 8.3      nRBC 0.0      RBC 3.91 (*)     Hemoglobin 8.3 (*)     Hematocrit 28.1 (*)     MCV 72 (*)     MCH 21.2 (*)     MCHC 29.5 (*)     RDW 18.1 (*)     Platelets 370      Neutrophils % 72.0      Immature Granulocytes %, Automated 0.5      Lymphocytes % 18.4      Monocytes % 5.7      Eosinophils % 2.7      Basophils % 0.7      Neutrophils Absolute 5.94      Immature Granulocytes Absolute, Automated 0.04      Lymphocytes Absolute 1.52      Monocytes Absolute 0.47      Eosinophils Absolute 0.22      Basophils Absolute 0.06     COMPREHENSIVE METABOLIC PANEL - Abnormal    Glucose 97      Sodium 134 (*)     Potassium 3.7      Chloride 102      Bicarbonate 23      Anion Gap 13      Urea Nitrogen 9      Creatinine 0.56      eGFR >90      Calcium 9.3      Albumin 3.7      Alkaline Phosphatase 88      Total Protein 7.8      AST 9      Bilirubin, Total 0.4      ALT 7     PROTIME-INR - Abnormal    Protime 16.9 (*)     INR 1.5 (*)    D-DIMER, VTE EXCLUSION - Abnormal    D-Dimer, Quantitative VTE Exclusion 4,969 (*)     Narrative:     The VTE Exclusion D-Dimer assay is reported in ng/mL Fibrinogen Equivalent Units (FEU).    Per 's instructions for use, a value of less than 500 ng/mL (FEU) may help to exclude DVT or PE in outpatients when the assay is used with a clinical pretest probability assessment.(A[facility] must utilize and  document eCalc 'Wells Score Deep Vein Thrombosis Risk' for DVT exclusion only. Emergency Department should utilize  Guidelines for Emergency Department Use of the VTE Exclusion D-Dimer and Clinical Pretest probability assessment model for DVT or PE exclusion.)   B-TYPE NATRIURETIC PEPTIDE - Normal    BNP 26      Narrative:        <100 pg/mL - Heart failure unlikely  100-299 pg/mL - Intermediate probability of acute heart                  failure exacerbation. Correlate with clinical                  context and patient history.    >=300 pg/mL - Heart Failure likely. Correlate with clinical                  context and patient history.    BNP testing is performed using different testing methodology at [facility] than at other [facility]. Direct result comparisons should only be made within the same method.      SERIAL TROPONIN-INITIAL - Normal    Troponin I, High Sensitivity <3      Narrative:     Less than 99th percentile of normal range cutoff-  Female and children under 18 years old <14 ng/L; Male <21 ng/L: Negative  Repeat testing should be performed if clinically indicated.     Female and children under 18 years old 14-50 ng/L; Male 21-50 ng/L:  Consistent with possible cardiac damage and possible increased clinical   risk. Serial measurements may help to assess extent of myocardial damage.     >50 ng/L: Consistent with cardiac damage, increased clinical risk and  myocardial infarction. Serial measurements may help assess extent of   myocardial damage.      NOTE: Children less than 1 year old may have higher baseline troponin   levels and results should be interpreted in conjunction with the overall   clinical context.     NOTE: Troponin I testing is performed using a different   testing methodology at [facility] than at other   [facility]. Direct result comparisons should only   be made within the same method.   SERIAL TROPONIN, 1 HOUR - Normal    Troponin I, High  Sensitivity <3      Narrative:     Less than 99th percentile of normal range cutoff-  Female and children under 18 years old <14 ng/L; Male <21 ng/L: Negative  Repeat testing should be performed if clinically indicated.     Female and children under 18 years old 14-50 ng/L; Male 21-50 ng/L:  Consistent with possible cardiac damage and possible increased clinical   risk. Serial measurements may help to assess extent of myocardial damage.     >50 ng/L: Consistent with cardiac damage, increased clinical risk and  myocardial infarction. Serial measurements may help assess extent of   myocardial damage.      NOTE: Children less than 1 year old may have higher baseline troponin   levels and results should be interpreted in conjunction with the overall   clinical context.     NOTE: Troponin I testing is performed using a different   testing methodology at [facility] than at other   [facility]. Direct result comparisons should only   be made within the same method.   TROPONIN SERIES- (INITIAL, 1 HR)    Narrative:     The following orders were created for panel order Troponin I Series, High Sensitivity (0, 1 HR).  Procedure                               Abnormality         Status                     ---------                               -----------         ------                     Troponin I, High Sensiti...[[id number]]  Normal              Final result               Troponin, High Sensitivi...[[id number]]  Normal              Final result                 Please view results for these tests on the individual orders.       All other labs were within normal range or not returned as of this dictation.    Imaging  CT angio chest for pulmonary embolism   Final Result   1. Large pericardial effusion is present, with some effacement of the   cardiac chamber is, concerning for early cardiac tamponade.   Echocardiogram is recommended for further assessment of the cardiac   function.   2. No evidence of pulmonary  embolism through the segmental level   within limits of mild motion.   3. Aneurysmal ascending thoracic aorta, measuring up to 4.3 cm in   diameter.   4. Atelectatic changes are present in the lingula and lower lobes   bilaterally without evidence of consolidation or other acute   pulmonary abnormality.        MACRO:   Long Cotter discussed the significance and urgency of this   critical finding by epic secure chat with  CHARLY VALLEJO on   11/29/2024 at 4:19 am.  (**-RCF-**) Findings:  See findings.        Signed by: Long Cotter 11/29/2024 4:19 AM   Dictation workstation:   [id number]      Point of Care Ultrasound   Final Result      Transthoracic Echo (TTE) Complete    (Results Pending)        Procedures  Procedures     EMERGENCY DEPARTMENT COURSE/MDM:     Diagnoses as of 11/29/24 0822   Pericardial effusion ([facility]-HCC)        Medical Decision Making    64-year-old female daily smoker with past medical history of rheumatoid arthritis and lupus, coronary artery disease with distant history of cardiac cath presenting to the emergency department for evaluation of sharp substernal chest pain radiating to the neck and left arm over the past 5 days which worsens on exertion and with associated exertional shortness of breath.  Intermittently tachycardic with heart rate ranging from the high 90s to low 100s.  Borderline tachypneic with respiratory rate of 20 but saturating in the mid to high 90s on room air without evidence of respiratory distress.  Normotensive and nontoxic-appearing.  Cardiac workup ordered as well as D-dimer to evaluate for possible PE.    EKG: Sinus rhythm with a ventricular rate of 99 bpm, DE interval 160 ms, QRS 62 ms, QTc 454 ms.  No evidence of acute ST changes noted.    EKG 2: Normal sinus rhythm with a ventricular rate of 98 bpm, DE interval 184 ms, QRS 54 ms, QTc 454 ms.  No evidence of acute ST changes noted.    D-dimer elevated 4969.  Stable microcytic anemia when  compared to CBC obtained at the [facility] 2 months ago.  Labs otherwise unremarkable to acute pathology.  Given patient's contrast allergy patient was given 40 mg of Solu-Medrol and 50 mg of Benadryl per accelerated contrast allergy protocol prior to CT angio chest PE which showed no evidence of acute pulmonary embolism but did show a large pericardial effusion on radiologist called to inform me that there was some effacement concerning for early cardiac tamponade.  Point-of-care ultrasound was performed at bedside which showed no evidence of right ventricular diastolic collapse or systolic atrial collapse to suggest tamponade.  Patient case discussed with cardiologist Dr. Hurley who advised ordering stat TTE and admitting patient to medicine at the level of IMCU and advise she will be on consult.  Patient case discussed with teaching service who agreed admit patient under the care of Dr. Hoffman.    Patient and or family in agreement and understanding of treatment plan.  All questions answered.      I reviewed the case with the attending ED physician. The attending ED physician agrees with the plan. Patient and/or patient´s representative was counseled regarding labs, imaging, likely diagnosis, and plan. All questions were answered.    ED Medications administered this visit:    Medications   methylPREDNISolone sod succinate (SOLU-Medrol) 40 mg/mL injection 40 mg (40 mg intravenous Given 11/29/24 0011)   oxygen (O2) therapy (3 L/min inhalation Start 11/29/24 0142)   perflutren lipid microspheres (Definity) injection 0.5-10 mL of dilution (has no administration in time range)   sulfur hexafluoride microsphr (Lumason) injection 24.28 mg (has no administration in time range)   perflutren protein A microsphere (Optison) injection 0.5 mL (has no administration in time range)   polyethylene glycol (Glycolax, Miralax) packet 17 g (has no administration in time range)   diphenhydrAMINE (BENADryl) injection 50 mg (50  mg intravenous Given 11/29/24 0212)   iohexol (OMNIPaque) 350 mg iodine/mL solution 70 mL (70 mL intravenous Given 11/29/24 0389)       New Prescriptions from this visit:    There are no discharge medications for this patient.      Follow-up:  No follow-up provider specified.      Final Impression:   1. Pericardial effusion ([facility])          (Please note that portions of this note were completed with a voice recognition program.  Efforts were made to edit the dictations but occasionally words are mis-transcribed.)     Musa Ling, DO  Resident  11/29/24 0076

## 2024-11-29 NOTE — PROGRESS NOTES
I received Marylou Cordero in signout from Dr. Dunaway.  Please see the previous note for all HPI, PE and MDM up to the time of signout at 0000.    In brief Marylou Cordero is an 64 y.o. female presenting for   Chief Complaint   Patient presents with    Chest Pain     Patient started having chest pain for 4-5 days. She woke up with edema In her left hand. She took 2 of her daughters Coumadin   .  At the time of signout we were awaiting: CTPE    In brief, patient is a 64-year-old female with a past medical history of RA, lupus presenting to the emergency department with around a week worth of chest pain.  Handed off to me by previous provider pending CT PE study given positive D-dimer.  This resulted during my shift and showed evidence of a large pericardial effusion with radiographic findings on CT concerning for possible tamponade.  My point-of-care ultrasound showed a significant effusion but no evidence of right sided atrial or ventricular collapse to suggest acute tamponade at this time.  Patient remained hemodynamically stable without evidence of obstructive shock.  Patient discussed with cardiology who recommended admission and consideration for nonemergent pericardial window by interventional cardiology as indicated following inpatient consult.  Recommended withholding any Lasix at this time.  Patient admitted in hemodynamically stable condition for further close evaluation and management.    Pt Disposition: Admit      Performed by: Michael Paredes MD  Authorized by: Michael Paredes MD  Cardiac Indications: chest pain                Procedure: Cardiac Ultrasound    Findings:   Views: parasternal long, parasternal short, apical four and subxiphoid  Effusion: The pericardial space was visualized and was positive for a PERICARDIAL EFFUSION.  Activity: Ventricular contractions were visualized.  LV: LV systolic function was NORMAL.  RV: RV size was NORMAL.    Impression:  Cardiac: The focused cardiac ultrasound  exam had ABNORMAL findings as specified.    Comments: No evidence of right ventricular or atrial collapse or definitive evidence of tamponade

## 2024-11-29 NOTE — DISCHARGE SUMMARY
Discharge Diagnosis  Pericardial effusion ([facility]HS-HCC)    Issues Requiring Follow-Up  Follow-up with cardiology regarding pericarditis and review of repeat echocardiogram.    Discharge Meds     Medication List      START taking these medications     colchicine 0.6 mg tablet; Take 1 tablet (0.6 mg) by mouth 2 times a day.     CONTINUE taking these medications     amLODIPine 10 mg tablet; Commonly known as: Norvasc   calcium carbonate-vitamin D3 500 mg-5 mcg (200 unit) tablet   cholecalciferol 5,000 Units tablet; Commonly known as: Vitamin D-3   cromolyn 4 % ophthalmic solution; Commonly known as: Opticrom   diphenhydrAMINE 25 mg capsule; Commonly known as: BENADryl   doxepin 5 % cream; Commonly known as: Zonalon   DULoxetine 30 mg DR capsule; Commonly known as: Cymbalta   folic acid 1 mg tablet; Commonly known as: Folvite   hydrOXYzine HCL 50 mg tablet; Commonly known as: Atarax   loratadine 10 mg tablet; Commonly known as: Claritin   LORazepam 1 mg tablet; Commonly known as: Ativan   lubricating eye drops ophthalmic solution   potassium chloride CR 10 mEq ER tablet; Commonly known as: Klor-Con   Pro Fe 180 mg iron capsule; Generic drug: polysaccharide iron complex     STOP taking these medications     furosemide 40 mg tablet; Commonly known as: Lasix   meloxicam 15 mg tablet; Commonly known as: Mobic       Test Results Pending At Discharge  Pending Labs       Order Current Status    JOVANNA with Reflex to JERE In process            Hospital Course  Marylou is a 64-year-old female with PMHx of RA, lupus, CAD, distant history of cardiac cath/PCI, VLAD, MDD, nicotine use disorder, GERD, HTN, obesity, meningioma s/p surgical resection in August 2024 at the [facility] who presented to [facility] ED complaining of chest pain and exertional dyspnea for the last 4 to 5 days.  History was taken from the patient and her son at bedside.  Patient reported that the exertional dyspnea in addition to difficulty when sleeping flat has been  presents with loss 2 to 3 weeks which was also accompanied by dry cough.  And over the last couple days she started having sharp stabbing chest pain that exaggerates with breathing with no specific factors that makes it better. The patient son also reported that she had multiple episodes of choking while asleep.  Patient reported that over the same duration she started noticing swelling in her lower extremities as well as upper extremities.  Patient did not have any formal diagnosis of COPD but she is a daily smoker.  Otherwise, patient denied having fever, chills, abdominal pain, recent change in bowel habits, or urinary symptoms.  However she does endorse having some shortness of breath with chest pain that improved compared to initial presentation.  She does also report having some dizziness.    Of note, she went to the [facility] ED on 9/22/2024 complaining of bilateral lower extremity swelling which started after her meningioma resection R>L as well as significant insomnia after the operation    In the ED, patient was vitally stable saturating 94% on room air however she became hypoxic and required oxygen supplement through nasal cannula (2 L).  She was also found to be anemic with a hemoglobin level of 8.3 and MCV level of 7.2 for which an iron profile was ordered that show reduction in iron and percent saturation.  However this has been ongoing since her surgery last August and no intervention was required at this time. Chemistry is unremarkable except for sodium 134.  Negative troponin, normal BNP 26.  Elevated D-dimer 4969.  INR 1.5, PT 16.9.  Imaging: CT angio chest revealed no PE but large pericardial effusion concerning for early cardiac tamponade, aneurysmal ascending thoracic aorta measuring up to 4.3 cm, atelectatic changes without consolidation.  An echocardiogram was done which showed normal left ventricular function LVEF 60%, small to moderate pericardial effusion.  Cardiology consulted and  recommended no further intervention at this time and to schedule a follow-up echocardiogram to monitor for progression and follow-up with Dr. Sandra within 2 to 4 weeks    Patient reported this morning and improvement in her symptoms.  She will be discharged from the hospital on colchicine 0.6 twice daily for 3 months.      Pertinent Physical Exam At Time of Discharge  Physical Exam  Constitutional:       Appearance: Normal appearance. She is normal weight.   HENT:      Head: Normocephalic and atraumatic.      Right Ear: External ear normal.      Left Ear: External ear normal.      Nose: Nose normal.      Mouth/Throat:      Mouth: Mucous membranes are moist.      Pharynx: Oropharynx is clear.   Eyes:      Extraocular Movements: Extraocular movements intact.      Conjunctiva/sclera: Conjunctivae normal.      Pupils: Pupils are equal, round, and reactive to light.   Cardiovascular:      Rate and Rhythm: Normal rate and regular rhythm.      Pulses: Normal pulses.      Heart sounds: Normal heart sounds.   Pulmonary:      Effort: Pulmonary effort is normal.      Breath sounds: Normal breath sounds.   Abdominal:      General: Abdomen is flat. Bowel sounds are normal.      Palpations: Abdomen is soft.   Musculoskeletal:         General: Normal range of motion.      Cervical back: Normal range of motion and neck supple.      Right lower leg: Edema present.      Left lower leg: Edema present.   Skin:     General: Skin is warm and dry.   Neurological:      General: No focal deficit present.      Mental Status: She is alert and oriented to person, place, and time.   Psychiatric:         Mood and Affect: Mood normal.         Behavior: Behavior normal.         Outpatient Follow-Up  No future appointments.      KAJAL RDZ MD

## 2024-11-29 NOTE — PROGRESS NOTES
Attempted to meet with patient but patient is sleeping soundly and did not arouse when called her name. Will attempt at another time.

## 2024-11-29 NOTE — CONSULTS
Consults    Reason For Consult  Pericardial effusion    History Of Present Illness  Marylou Cordero is a 64 y.o. female with a reported medical history of RA, SLE, and CAD s/p PCI who presented to the [facility] emergency department due to sharp substernal chest pain radiating to the neck and left arm x 5 days.  Reports pain worse on exertion with associated exertional shortness of breath.  Also reports of cough x 2 weeks.  Denies any palpitations, lightheadedness, fever, chills, night sweats, rhinorrhea, nausea, vomiting, diarrhea.  Of note, patient's medical record reports prior history of RA/SLE however unable to find prior JOVANNA, RF to indicate seropositive diagnosis.  Patient is also not on any therapy for RA/SLE.    ED course:  Vital signs: Afebrile 98.6, heart rate 98, respiratory rate 20, blood pressure 119/77, SpO2 94% on room air  Labs: CBC without any leukocytosis.  Hemoglobin 8.3, MCV 72 (baseline hemoglobin 8-9).  Normal platelets.  CMP with mild hyponatremia otherwise unremarkable.  Troponin negative x 2.  D-dimer elevated 4969.  Imaging: CT PE with a large pericardial effusion with some effacement of the cardiac chamber concerning for early cardiac tamponade.  No evidence of PE.  Aneurysmal ascending thoracic aorta measuring up to 4.3 cm in diameter.       Past Medical History  She has no past medical history on file.    Surgical History  She has a past surgical history that includes Other surgical history (01/12/2016).     Social History  She reports that she has been smoking cigarettes. She has never used smokeless tobacco. She reports that she does not use drugs. No history on file for alcohol use.    Family History  No family history on file.     Allergies  Iodinated contrast media    Review of Systems  -----------------------------------------------------------------  Review of Systems     Constitutional: Denies  Fever, Chills    Respiratory: (+) Cough    Cardiac: (+) Chest Pain  Denies Syncope, Palpitations    Gastrointestinal: Denies Nausea, Vomiting, Diarrhea, Constipation, Abdominal Pain    Musculoskeletal: Denies Pain, Swelling, Weakness    Neurological:  Denies Dizziness, Confusion, Headache, Syncope    Skin: Denies Pain, Rash, Ulcer       Physical Exam  General: Patient sleeping, but easily arousable  Head: Atraumatic/Normocephalic  Cardiovascular: Regular rate and rhythm, normal S1/S2, no definite pericardial friction rub auscultated  Pulmonary: CTAB, no respiratory distress. No wheezes, rales, or ronchi  Abdomen: +BS, soft, non-tender, nondistended, no guarding or rebound, no masses noted  MSK: No joint swelling, normal movements of all extremities. Range of motion- normal.  Extremities: FROM, no edema, wounds, or contusions  Neuro: Sleeping, but easily arousable         Last Recorded Vitals  BP (!) 144/96 (BP Location: Right arm, Patient Position: Lying)   Pulse 96   Temp 36.5 °C (97.7 °F) (Temporal)   Resp 20   Wt 114 kg (252 lb)   SpO2 95%     Relevant Results  Results for orders placed or performed during the hospital encounter of 11/28/24 (from the past 24 hours)   CBC with Differential   Result Value Ref Range    WBC 8.3 4.4 - 11.3 x10*3/uL    nRBC 0.0 0.0 - 0.0 /100 WBCs    RBC 3.91 (L) 4.00 - 5.20 x10*6/uL    Hemoglobin 8.3 (L) 12.0 - 16.0 g/dL    Hematocrit 28.1 (L) 36.0 - 46.0 %    MCV 72 (L) 80 - 100 fL    MCH 21.2 (L) 26.0 - 34.0 pg    MCHC 29.5 (L) 32.0 - 36.0 g/dL    RDW 18.1 (H) 11.5 - 14.5 %    Platelets 370 150 - 450 x10*3/uL    Neutrophils % 72.0 40.0 - 80.0 %    Immature Granulocytes %, Automated 0.5 0.0 - 0.9 %    Lymphocytes % 18.4 13.0 - 44.0 %    Monocytes % 5.7 2.0 - 10.0 %    Eosinophils % 2.7 0.0 - 6.0 %    Basophils % 0.7 0.0 - 2.0 %    Neutrophils Absolute 5.94 1.20 - 7.70 x10*3/uL    Immature Granulocytes Absolute, Automated 0.04 0.00 - 0.70 x10*3/uL    Lymphocytes Absolute 1.52 1.20 - 4.80 x10*3/uL    Monocytes Absolute 0.47 0.10 - 1.00 x10*3/uL     Eosinophils Absolute 0.22 0.00 - 0.70 x10*3/uL    Basophils Absolute 0.06 0.00 - 0.10 x10*3/uL   Comprehensive Metabolic Panel   Result Value Ref Range    Glucose 97 74 - 99 mg/dL    Sodium 134 (L) 136 - 145 mmol/L    Potassium 3.7 3.5 - 5.3 mmol/L    Chloride 102 98 - 107 mmol/L    Bicarbonate 23 21 - 32 mmol/L    Anion Gap 13 10 - 20 mmol/L    Urea Nitrogen 9 6 - 23 mg/dL    Creatinine 0.56 0.50 - 1.05 mg/dL    eGFR >90 >60 mL/min/1.73m*2    Calcium 9.3 8.6 - 10.3 mg/dL    Albumin 3.7 3.4 - 5.0 g/dL    Alkaline Phosphatase 88 33 - 136 U/L    Total Protein 7.8 6.4 - 8.2 g/dL    AST 9 9 - 39 U/L    Bilirubin, Total 0.4 0.0 - 1.2 mg/dL    ALT 7 7 - 45 U/L   Brain Natriuretic Peptide   Result Value Ref Range    BNP 26 0 - 99 pg/mL   Protime-INR   Result Value Ref Range    Protime 16.9 (H) 9.8 - 12.8 seconds    INR 1.5 (H) 0.9 - 1.1   Troponin I, High Sensitivity, Initial   Result Value Ref Range    Troponin I, High Sensitivity <3 0 - 13 ng/L   D-dimer, VTE Exclusion   Result Value Ref Range    D-Dimer, Quantitative VTE Exclusion 4,969 (H) <=500 ng/mL FEU   ECG 12 lead   Result Value Ref Range    Ventricular Rate 99 BPM    Atrial Rate 99 BPM    ME Interval 168 ms    QRS Duration 62 ms    QT Interval 354 ms    QTC Calculation(Bazett) 454 ms    P Axis 40 degrees    R Axis -4 degrees    T Axis 41 degrees    QRS Count 17 beats    Q Onset 223 ms    P Onset 139 ms    P Offset 182 ms    T Offset 400 ms    QTC Fredericia 418 ms   Troponin, High Sensitivity, 1 Hour   Result Value Ref Range    Troponin I, High Sensitivity <3 0 - 13 ng/L   ECG 12 lead   Result Value Ref Range    Ventricular Rate 98 BPM    Atrial Rate 98 BPM    ME Interval 184 ms    QRS Duration 54 ms    QT Interval 356 ms    QTC Calculation(Bazett) 454 ms    P Axis 59 degrees    R Axis 16 degrees    T Axis 58 degrees    QRS Count 16 beats    Q Onset 228 ms    P Onset 136 ms    P Offset 191 ms    T Offset 406 ms    QTC Fredericia 419 ms   Transthoracic Echo (TTE)  Complete   Result Value Ref Range    AV pk ryan 1.60 m/s    LV Biplane EF 54 %    LVOT diam 2.00 cm    MV E/A ratio 0.73     MV avg E/e' ratio 6.30     Tricuspid annular plane systolic excursion 2.0 cm    LV EF 60 %    RV free wall pk S' 11.00 cm/s    LVIDd 3.36 cm    Aortic Valve Area by Continuity of Peak Velocity 2.16 cm2    AV pk grad 10 mmHg    LV A4C EF 55.4    C-reactive protein   Result Value Ref Range    C-Reactive Protein 13.87 (H) <1.00 mg/dL   Iron and TIBC   Result Value Ref Range    Iron 14 (L) 35 - 150 ug/dL    UIBC 357 110 - 370 ug/dL    TIBC 371 240 - 445 ug/dL    % Saturation 4 (L) 25 - 45 %   Ferritin   Result Value Ref Range    Ferritin 55 8 - 150 ng/mL   CBC   Result Value Ref Range    WBC 8.0 4.4 - 11.3 x10*3/uL    nRBC 0.0 0.0 - 0.0 /100 WBCs    RBC 4.11 4.00 - 5.20 x10*6/uL    Hemoglobin 8.7 (L) 12.0 - 16.0 g/dL    Hematocrit 29.9 (L) 36.0 - 46.0 %    MCV 73 (L) 80 - 100 fL    MCH 21.2 (L) 26.0 - 34.0 pg    MCHC 29.1 (L) 32.0 - 36.0 g/dL    RDW 18.1 (H) 11.5 - 14.5 %    Platelets 372 150 - 450 x10*3/uL         Assessment/Plan     #Pericardial effusion  -Echocardiogram shows normal EF.  Grade 1 diastolic dysfunction.  Normal right ventricular systolic function.  Pericardial effusion appears small to moderate without any evidence for cardiac tamponade.  Mild IVC dilatation  -Small to moderate pericardial effusion appears too small to tap at this time  -Given questionable prior history of RA/SLE we have ordered ESR, CRP, JOVANNA with reflex, and RF  -Can treat pericardial effusion with colchicine 0.6 mg twice daily    # Microcytic anemia  -Hemoglobin 8.3, MCV 72 which appears to be around patient's baseline  -Iron panel pending at this time    Cardiology will continue to follow    Assessment and plan discussed with Dr. Jocy Bertrand, DO  Internal medicine, PGY 3

## 2024-11-30 LAB
ATRIAL RATE: 98 BPM
ATRIAL RATE: 99 BPM
P AXIS: 40 DEGREES
P AXIS: 59 DEGREES
P OFFSET: 182 MS
P OFFSET: 191 MS
P ONSET: 136 MS
P ONSET: 139 MS
PR INTERVAL: 168 MS
PR INTERVAL: 184 MS
Q ONSET: 223 MS
Q ONSET: 228 MS
QRS COUNT: 16 BEATS
QRS COUNT: 17 BEATS
QRS DURATION: 54 MS
QRS DURATION: 62 MS
QT INTERVAL: 354 MS
QT INTERVAL: 356 MS
QTC CALCULATION(BAZETT): 454 MS
QTC CALCULATION(BAZETT): 454 MS
QTC FREDERICIA: 418 MS
QTC FREDERICIA: 419 MS
R AXIS: -4 DEGREES
R AXIS: 16 DEGREES
T AXIS: 41 DEGREES
T AXIS: 58 DEGREES
T OFFSET: 400 MS
T OFFSET: 406 MS
VENTRICULAR RATE: 98 BPM
VENTRICULAR RATE: 99 BPM

## 2024-12-03 LAB
ANA PATTERN: ABNORMAL
ANA SER QL HEP2 SUBST: POSITIVE
ANA TITR SER IF: ABNORMAL {TITER}
CENTROMERE B AB SER-ACNC: <0.2 AI
CHROMATIN AB SERPL-ACNC: <0.2 AI
DSDNA AB SER-ACNC: 1 IU/ML
ENA JO1 AB SER QL IA: <0.2 AI
ENA RNP AB SER IA-ACNC: >8 AI
ENA SCL70 AB SER QL IA: <0.2 AI
ENA SM AB SER IA-ACNC: <0.2 AI
ENA SM+RNP AB SER QL IA: <0.2 AI
ENA SS-A AB SER IA-ACNC: 0.2 AI
ENA SS-B AB SER IA-ACNC: <0.2 AI
RIBOSOMAL P AB SER-ACNC: <0.2 AI

## 2024-12-12 ENCOUNTER — APPOINTMENT (OUTPATIENT)
Dept: CARDIOLOGY | Facility: HOSPITAL | Age: 64
End: 2024-12-12
Payer: COMMERCIAL

## 2024-12-12 ENCOUNTER — APPOINTMENT (OUTPATIENT)
Dept: RADIOLOGY | Facility: HOSPITAL | Age: 64
End: 2024-12-12
Payer: COMMERCIAL

## 2024-12-12 ENCOUNTER — HOSPITAL ENCOUNTER (EMERGENCY)
Facility: HOSPITAL | Age: 64
Discharge: HOME | End: 2024-12-12
Attending: EMERGENCY MEDICINE
Payer: COMMERCIAL

## 2024-12-12 VITALS
HEART RATE: 102 BPM | BODY MASS INDEX: 43.02 KG/M2 | WEIGHT: 252 LBS | DIASTOLIC BLOOD PRESSURE: 87 MMHG | RESPIRATION RATE: 18 BRPM | TEMPERATURE: 98.2 F | SYSTOLIC BLOOD PRESSURE: 134 MMHG | HEIGHT: 64 IN | OXYGEN SATURATION: 95 %

## 2024-12-12 DIAGNOSIS — J18.9 PNEUMONIA OF RIGHT LUNG DUE TO INFECTIOUS ORGANISM, UNSPECIFIED PART OF LUNG: Primary | ICD-10-CM

## 2024-12-12 LAB
ALBUMIN SERPL BCP-MCNC: 3.9 G/DL (ref 3.4–5)
ALP SERPL-CCNC: 105 U/L (ref 33–136)
ALT SERPL W P-5'-P-CCNC: 9 U/L (ref 7–45)
ANION GAP SERPL CALC-SCNC: 15 MMOL/L (ref 10–20)
AST SERPL W P-5'-P-CCNC: 13 U/L (ref 9–39)
BASOPHILS # BLD AUTO: 0.09 X10*3/UL (ref 0–0.1)
BASOPHILS NFR BLD AUTO: 0.9 %
BILIRUB SERPL-MCNC: 0.4 MG/DL (ref 0–1.2)
BNP SERPL-MCNC: 69 PG/ML (ref 0–99)
BUN SERPL-MCNC: 13 MG/DL (ref 6–23)
CALCIUM SERPL-MCNC: 9.3 MG/DL (ref 8.6–10.3)
CARDIAC TROPONIN I PNL SERPL HS: 3 NG/L (ref 0–13)
CARDIAC TROPONIN I PNL SERPL HS: 3 NG/L (ref 0–13)
CHLORIDE SERPL-SCNC: 103 MMOL/L (ref 98–107)
CO2 SERPL-SCNC: 23 MMOL/L (ref 21–32)
CREAT SERPL-MCNC: 0.68 MG/DL (ref 0.5–1.05)
EGFRCR SERPLBLD CKD-EPI 2021: >90 ML/MIN/1.73M*2
EOSINOPHIL # BLD AUTO: 0.59 X10*3/UL (ref 0–0.7)
EOSINOPHIL NFR BLD AUTO: 5.7 %
ERYTHROCYTE [DISTWIDTH] IN BLOOD BY AUTOMATED COUNT: 19.2 % (ref 11.5–14.5)
FLUAV RNA RESP QL NAA+PROBE: NOT DETECTED
FLUBV RNA RESP QL NAA+PROBE: NOT DETECTED
GLUCOSE SERPL-MCNC: 134 MG/DL (ref 74–99)
HCT VFR BLD AUTO: 33.3 % (ref 36–46)
HGB BLD-MCNC: 9.5 G/DL (ref 12–16)
IMM GRANULOCYTES # BLD AUTO: 0.05 X10*3/UL (ref 0–0.7)
IMM GRANULOCYTES NFR BLD AUTO: 0.5 % (ref 0–0.9)
INR PPP: 1.3 (ref 0.9–1.1)
LYMPHOCYTES # BLD AUTO: 1.73 X10*3/UL (ref 1.2–4.8)
LYMPHOCYTES NFR BLD AUTO: 16.8 %
MCH RBC QN AUTO: 20.3 PG (ref 26–34)
MCHC RBC AUTO-ENTMCNC: 28.5 G/DL (ref 32–36)
MCV RBC AUTO: 71 FL (ref 80–100)
MONOCYTES # BLD AUTO: 0.43 X10*3/UL (ref 0.1–1)
MONOCYTES NFR BLD AUTO: 4.2 %
NEUTROPHILS # BLD AUTO: 7.43 X10*3/UL (ref 1.2–7.7)
NEUTROPHILS NFR BLD AUTO: 71.9 %
NRBC BLD-RTO: 0 /100 WBCS (ref 0–0)
PLATELET # BLD AUTO: 511 X10*3/UL (ref 150–450)
POTASSIUM SERPL-SCNC: 4.3 MMOL/L (ref 3.5–5.3)
PROT SERPL-MCNC: 7.6 G/DL (ref 6.4–8.2)
PROTHROMBIN TIME: 15 SECONDS (ref 9.8–12.8)
RBC # BLD AUTO: 4.68 X10*6/UL (ref 4–5.2)
RSV RNA RESP QL NAA+PROBE: NOT DETECTED
SARS-COV-2 RNA RESP QL NAA+PROBE: NOT DETECTED
SODIUM SERPL-SCNC: 137 MMOL/L (ref 136–145)
WBC # BLD AUTO: 10.3 X10*3/UL (ref 4.4–11.3)

## 2024-12-12 PROCEDURE — 84484 ASSAY OF TROPONIN QUANT: CPT | Performed by: EMERGENCY MEDICINE

## 2024-12-12 PROCEDURE — 2500000001 HC RX 250 WO HCPCS SELF ADMINISTERED DRUGS (ALT 637 FOR MEDICARE OP)

## 2024-12-12 PROCEDURE — 96365 THER/PROPH/DIAG IV INF INIT: CPT | Mod: 59

## 2024-12-12 PROCEDURE — 71275 CT ANGIOGRAPHY CHEST: CPT

## 2024-12-12 PROCEDURE — 84075 ASSAY ALKALINE PHOSPHATASE: CPT | Performed by: EMERGENCY MEDICINE

## 2024-12-12 PROCEDURE — 93005 ELECTROCARDIOGRAM TRACING: CPT

## 2024-12-12 PROCEDURE — 96376 TX/PRO/DX INJ SAME DRUG ADON: CPT

## 2024-12-12 PROCEDURE — 87637 SARSCOV2&INF A&B&RSV AMP PRB: CPT

## 2024-12-12 PROCEDURE — 71275 CT ANGIOGRAPHY CHEST: CPT | Mod: FOREIGN READ | Performed by: RADIOLOGY

## 2024-12-12 PROCEDURE — 2550000001 HC RX 255 CONTRASTS: Performed by: EMERGENCY MEDICINE

## 2024-12-12 PROCEDURE — 93010 ELECTROCARDIOGRAM REPORT: CPT | Performed by: EMERGENCY MEDICINE

## 2024-12-12 PROCEDURE — 99285 EMERGENCY DEPT VISIT HI MDM: CPT | Mod: 25 | Performed by: EMERGENCY MEDICINE

## 2024-12-12 PROCEDURE — 83880 ASSAY OF NATRIURETIC PEPTIDE: CPT | Performed by: EMERGENCY MEDICINE

## 2024-12-12 PROCEDURE — 71045 X-RAY EXAM CHEST 1 VIEW: CPT | Performed by: RADIOLOGY

## 2024-12-12 PROCEDURE — 36415 COLL VENOUS BLD VENIPUNCTURE: CPT | Performed by: EMERGENCY MEDICINE

## 2024-12-12 PROCEDURE — 99285 EMERGENCY DEPT VISIT HI MDM: CPT | Performed by: EMERGENCY MEDICINE

## 2024-12-12 PROCEDURE — 96375 TX/PRO/DX INJ NEW DRUG ADDON: CPT | Mod: 59

## 2024-12-12 PROCEDURE — 85025 COMPLETE CBC W/AUTO DIFF WBC: CPT | Performed by: EMERGENCY MEDICINE

## 2024-12-12 PROCEDURE — 2500000004 HC RX 250 GENERAL PHARMACY W/ HCPCS (ALT 636 FOR OP/ED)

## 2024-12-12 PROCEDURE — 85610 PROTHROMBIN TIME: CPT | Performed by: EMERGENCY MEDICINE

## 2024-12-12 PROCEDURE — 71045 X-RAY EXAM CHEST 1 VIEW: CPT

## 2024-12-12 RX ORDER — CEFTRIAXONE 2 G/50ML
2 INJECTION, SOLUTION INTRAVENOUS ONCE
Status: COMPLETED | OUTPATIENT
Start: 2024-12-12 | End: 2024-12-12

## 2024-12-12 RX ORDER — DIPHENHYDRAMINE HYDROCHLORIDE 50 MG/ML
50 INJECTION INTRAMUSCULAR; INTRAVENOUS ONCE
Status: COMPLETED | OUTPATIENT
Start: 2024-12-12 | End: 2024-12-12

## 2024-12-12 RX ORDER — DOXYCYCLINE 100 MG/1
100 CAPSULE ORAL 2 TIMES DAILY
Qty: 14 CAPSULE | Refills: 0 | Status: SHIPPED | OUTPATIENT
Start: 2024-12-12 | End: 2024-12-19

## 2024-12-12 RX ORDER — CEFDINIR 300 MG/1
300 CAPSULE ORAL 2 TIMES DAILY
Qty: 14 CAPSULE | Refills: 0 | Status: SHIPPED | OUTPATIENT
Start: 2024-12-12 | End: 2024-12-19

## 2024-12-12 RX ORDER — AZITHROMYCIN 500 MG/1
500 TABLET, FILM COATED ORAL ONCE
Status: COMPLETED | OUTPATIENT
Start: 2024-12-12 | End: 2024-12-12

## 2024-12-12 ASSESSMENT — PAIN DESCRIPTION - ORIENTATION: ORIENTATION: LEFT

## 2024-12-12 ASSESSMENT — PAIN SCALES - GENERAL
PAINLEVEL_OUTOF10: 0 - NO PAIN
PAINLEVEL_OUTOF10: 5 - MODERATE PAIN

## 2024-12-12 ASSESSMENT — PAIN DESCRIPTION - PAIN TYPE: TYPE: ACUTE PAIN

## 2024-12-12 ASSESSMENT — PAIN DESCRIPTION - LOCATION: LOCATION: ARM

## 2024-12-12 ASSESSMENT — PAIN - FUNCTIONAL ASSESSMENT
PAIN_FUNCTIONAL_ASSESSMENT: 0-10
PAIN_FUNCTIONAL_ASSESSMENT: 0-10

## 2024-12-12 NOTE — ED PROVIDER NOTES
EMERGENCY DEPARTMENT ENCOUNTER      Pt Name: Marylou Cordero  MRN: [id number]  Birthdate 1960  Date of evaluation: 12/12/2024  Provider: Malik Zayas MD    CHIEF COMPLAINT       Chief Complaint   Patient presents with    Shortness of Breath     Pt was here last week for fluid around the heart and is having trouble breathing    Arm Pain     Left arm pain that has been going for months         HISTORY OF PRESENT ILLNESS    HPI  Patient is a 64-year-old female with a history of lupus, rheumatoid arthritis, CAD with stenting, HTN, migraines, anxiety, depression, meningioma status post resection, daily smoker presenting with shortness of breath.  This is been ongoing for the past couple weeks.  She was recently admitted with a pericardial effusion and is being treated with colchicine.  She was discharged after her echocardiogram did not reveal any evidence of tamponade.  She states they really got better.  She does have right-sided pleuritic chest pain.  5/10, exacerbated with inspiration, without consistently being factors, dull, nonradiating.  She notes a nonproductive cough and nausea she denies fever, sweats, abdominal pain, dysuria, hematuria, diarrhea, dark or bloody stools.    Nursing Notes were reviewed.    PAST MEDICAL HISTORY   History reviewed. No pertinent past medical history.      SURGICAL HISTORY       Past Surgical History:   Procedure Laterality Date    OTHER SURGICAL HISTORY  01/12/2016    Incision Of Perianal Abscess         CURRENT MEDICATIONS       Discharge Medication List as of 12/12/2024  9:10 PM        CONTINUE these medications which have NOT CHANGED    Details   amLODIPine (Norvasc) 10 mg tablet Take 1 tablet (10 mg) by mouth once daily., Historical Med      calcium carbonate-vitamin D3 500 mg-5 mcg (200 unit) tablet Take 1 tablet by mouth once daily., Historical Med      cholecalciferol (Vitamin D-3) 5,000 Units tablet Take 1 tablet (5,000 Units) by mouth once daily., Historical Med       colchicine 0.6 mg tablet Take 1 tablet (0.6 mg) by mouth 2 times a day., Starting Fri 11/29/2024, Until Thu 2/27/2025, Normal      cromolyn (Opticrom) 4 % ophthalmic solution Administer 1 drop into both eyes 4 times a day., Historical Med      diphenhydrAMINE (BENADryl) 25 mg capsule Take 1 capsule (25 mg) by mouth every 8 hours if needed for itching., Historical Med      doxepin (Zonalon) 5 % cream Apply 1 Application topically 2 times a day. To face, Historical Med      DULoxetine (Cymbalta) 30 mg DR capsule Take 1 capsule (30 mg) by mouth 2 times a day., Historical Med      folic acid (Folvite) 1 mg tablet Take 2 tablets (2 mg) by mouth once daily., Historical Med      hydrOXYzine HCL (Atarax) 50 mg tablet Take 1 tablet (50 mg) by mouth every 8 hours if needed for itching., Historical Med      loratadine (Claritin) 10 mg tablet Take 1 tablet (10 mg) by mouth once daily., Historical Med      LORazepam (Ativan) 1 mg tablet Take 1 tablet (1 mg) by mouth every 12 hours., Historical Med      lubricating eye drops ophthalmic solution Administer 1 drop into both eyes if needed for dry eyes., Historical Med      polysaccharide iron complex (Pro Fe) 180 mg iron capsule Take 1 capsule (391.3 mg) by mouth once daily., Historical Med      potassium chloride CR 10 mEq ER tablet Take 1 tablet (10 mEq) by mouth 2 times a day., Historical Med             ALLERGIES     Amoxicillin-pot clavulanate, Iodinated contrast media, Moxifloxacin, Escitalopram, Hydrocodone-acetaminophen, and Rabeprazole sodium    FAMILY HISTORY     No family history on file.       SOCIAL HISTORY       Social History     Socioeconomic History    Marital status:    Tobacco Use    Smoking status: Every Day     Types: Cigarettes    Smokeless tobacco: Never   Substance and Sexual Activity    Drug use: Never     Social Drivers of Health     Financial Resource Strain: Patient Declined (11/29/2024)    Overall Financial Resource Strain (CARDIA)      Difficulty of Paying Living Expenses: Patient declined   Food Insecurity: Patient Declined (11/29/2024)    Hunger Vital Sign     Worried About Running Out of Food in the Last Year: Patient declined     Ran Out of Food in the Last Year: Patient declined   Transportation Needs: Patient Declined (11/29/2024)    PRAPARE - Transportation     Lack of Transportation (Medical): Patient declined     Lack of Transportation (Non-Medical): Patient declined   Intimate Partner Violence: Patient Declined (11/29/2024)    Humiliation, Afraid, Rape, and Kick questionnaire     Fear of Current or Ex-Partner: Patient declined     Emotionally Abused: Patient declined     Physically Abused: Patient declined     Sexually Abused: Patient declined   Housing Stability: Patient Declined (11/29/2024)    Housing Stability Vital Sign     Unable to Pay for Housing in the Last Year: Patient declined     Number of Times Moved in the Last Year: 0     Homeless in the Last Year: Patient declined       SCREENINGS                        PHYSICAL EXAM    (up to 7 for level 4, 8 or more for level 5)     ED Triage Vitals [12/12/24 1320]   Temperature Heart Rate Respirations BP   36.8 °C (98.2 °F) 100 18 119/89      Pulse Ox Temp src Heart Rate Source Patient Position   97 % -- Monitor Sitting      BP Location FiO2 (%)     Right arm --       Physical Exam  Constitutional:       General: She is not in acute distress.     Appearance: She is obese. She is not toxic-appearing.   HENT:      Head: Normocephalic and atraumatic.      Mouth/Throat:      Mouth: Mucous membranes are moist.      Pharynx: Oropharynx is clear.   Eyes:      Extraocular Movements: Extraocular movements intact.      Pupils: Pupils are equal, round, and reactive to light.   Cardiovascular:      Rate and Rhythm: Regular rhythm. Tachycardia present.      Heart sounds: Normal heart sounds.   Pulmonary:      Effort: Pulmonary effort is normal. No respiratory distress.      Comments: Diminished  breath sounds in the bases  Abdominal:      Palpations: Abdomen is soft.      Tenderness: There is no abdominal tenderness.   Musculoskeletal:      Cervical back: Normal range of motion and neck supple.      Right lower leg: No tenderness. Edema present.      Left lower leg: No tenderness. Edema present.   Skin:     General: Skin is warm and dry.      Capillary Refill: Capillary refill takes less than 2 seconds.   Neurological:      General: No focal deficit present.          DIAGNOSTIC RESULTS     LABS:  Labs Reviewed   CBC WITH AUTO DIFFERENTIAL - Abnormal       Result Value    WBC 10.3      nRBC 0.0      RBC 4.68      Hemoglobin 9.5 (*)     Hematocrit 33.3 (*)     MCV 71 (*)     MCH 20.3 (*)     MCHC 28.5 (*)     RDW 19.2 (*)     Platelets 511 (*)     Neutrophils % 71.9      Immature Granulocytes %, Automated 0.5      Lymphocytes % 16.8      Monocytes % 4.2      Eosinophils % 5.7      Basophils % 0.9      Neutrophils Absolute 7.43      Immature Granulocytes Absolute, Automated 0.05      Lymphocytes Absolute 1.73      Monocytes Absolute 0.43      Eosinophils Absolute 0.59      Basophils Absolute 0.09     COMPREHENSIVE METABOLIC PANEL - Abnormal    Glucose 134 (*)     Sodium 137      Potassium 4.3      Chloride 103      Bicarbonate 23      Anion Gap 15      Urea Nitrogen 13      Creatinine 0.68      eGFR >90      Calcium 9.3      Albumin 3.9      Alkaline Phosphatase 105      Total Protein 7.6      AST 13      Bilirubin, Total 0.4      ALT 9     PROTIME-INR - Abnormal    Protime 15.0 (*)     INR 1.3 (*)    B-TYPE NATRIURETIC PEPTIDE - Normal    BNP 69      Narrative:        <100 pg/mL - Heart failure unlikely  100-299 pg/mL - Intermediate probability of acute heart                  failure exacerbation. Correlate with clinical                  context and patient history.    >=300 pg/mL - Heart Failure likely. Correlate with clinical                  context and patient history.    BNP testing is performed using  different testing methodology at [facility] than at [facility]. Direct result comparisons should only be made within the same method.      SERIAL TROPONIN-INITIAL - Normal    Troponin I, High Sensitivity 3      Narrative:     Less than 99th percentile of normal range cutoff-  Female and children under 18 years old <14 ng/L; Male <21 ng/L: Negative  Repeat testing should be performed if clinically indicated.     Female and children under 18 years old 14-50 ng/L; Male 21-50 ng/L:  Consistent with possible cardiac damage and possible increased clinical   risk. Serial measurements may help to assess extent of myocardial damage.     >50 ng/L: Consistent with cardiac damage, increased clinical risk and  myocardial infarction. Serial measurements may help assess extent of   myocardial damage.      NOTE: Children less than 1 year old may have higher baseline troponin   levels and results should be interpreted in conjunction with the overall   clinical context.     NOTE: Troponin I testing is performed using a different   testing methodology at [facility] than at [facility]. Direct result comparisons should only   be made within the same method.   SERIAL TROPONIN, 1 HOUR - Normal    Troponin I, High Sensitivity 3      Narrative:     Less than 99th percentile of normal range cutoff-  Female and children under 18 years old <14 ng/L; Male <21 ng/L: Negative  Repeat testing should be performed if clinically indicated.     Female and children under 18 years old 14-50 ng/L; Male 21-50 ng/L:  Consistent with possible cardiac damage and possible increased clinical   risk. Serial measurements may help to assess extent of myocardial damage.     >50 ng/L: Consistent with cardiac damage, increased clinical risk and  myocardial infarction. Serial measurements may help assess extent of   myocardial damage.      NOTE: Children less than 1 year old may have higher baseline troponin    levels and results should be interpreted in conjunction with the overall   clinical context.     NOTE: Troponin I testing is performed using a different   testing methodology at [facility] than at other   [facility]. Direct result comparisons should only   be made within the same method.   SARS-COV-2 PCR - Normal    Coronavirus 2019, PCR Not Detected      Narrative:     This assay has received FDA Emergency Use Authorization (EUA) and is only authorized for the duration of time that circumstances exist to justify the authorization of the emergency use of in vitro diagnostic tests for the detection of SARS-CoV-2 virus and/or diagnosis of COVID-19 infection under section 564(b)(1) of the Act, 21 U.S.C. 360bbb-3(b)(1). This assay is an in vitro diagnostic nucleic acid amplification test for the qualitative detection of SARS-CoV-2 from nasopharyngeal specimens and has been validated for use at [facility]. Negative results do not preclude COVID-19 infections and should not be used as the sole basis for diagnosis, treatment, or other management decisions.     INFLUENZA A AND B PCR - Normal    Flu A Result Not Detected      Flu B Result Not Detected      Narrative:     This assay is an in vitro diagnostic multiplex nucleic acid amplification test for the detection and discrimination of Influenza A & B from nasopharyngeal specimens, and has been validated for use at [facility]. Negative results do not preclude Influenza A/B infections, and should not be used as the sole basis for diagnosis, treatment, or other management decisions. If Influenza A/B and RSV PCR results are negative, testing for Parainfluenza virus, Adenovirus and Metapneumovirus is routinely performed for [facility] pediatric oncology and intensive care inpatients, and is available on other patients by placing an add-on request.   RSV PCR - Normal    RSV PCR Not Detected      Narrative:     This  assay is an FDA-cleared, in vitro diagnostic nucleic acid amplification test for the detection of RSV from nasopharyngeal specimens, and has been validated for use at [facility]. Negative results do not preclude RSV infections, and should not be used as the sole basis for diagnosis, treatment, or other management decisions. If Influenza A/B and RSV PCR results are negative, testing for Parainfluenza virus, Adenovirus and Metapneumovirus is routinely performed for pediatric oncology and intensive care inpatients at [facility], and is available on other patients by placing an add-on request.       TROPONIN SERIES- (INITIAL, 1 HR)    Narrative:     The following orders were created for panel order Troponin I Series, High Sensitivity (0, 1 HR).  Procedure                               Abnormality         Status                     ---------                               -----------         ------                     Troponin I, High Sensiti...[[id number]]  Normal              Final result               Troponin, High Sensitivi...[[id number]]  Normal              Final result                 Please view results for these tests on the individual orders.       All other labs were within normal range or not returned as of this dictation.    Imaging  CT angio chest for pulmonary embolism   Final Result   1. No evidence for acute pulmonary embolism   2. Aneurysmal ascending aorta.   3. Small to moderate pericardial effusion and small bilateral pleural   effusions. The pericardial effusion is mildly decreased in size.   4. Hiatal hernia   Signed by Lito Germain MD      XR chest 1 view   Final Result   1.  Probable early pneumonic infiltrate in the right lower lung and   possibly with bronchitis and some bands of atelectasis.   2. Questionable mild congestion, possibly from CHF.        Signed by: Bertin Isabel 12/12/2024 2:27 PM   Dictation workstation:   [id number]           Procedures  Procedures     EMERGENCY  DEPARTMENT COURSE/MDM:     ED Course as of 12/12/24 2149   Thu Dec 12, 2024   2034 EKG: Sinus rhythm at 94 bpm.  .  QRS 64.  QTc 462.  Low voltage.  No ST elevations, no acute ischemic pattern [PS]      ED Course User Index  [PS] Fantasma Duran DO         Diagnoses as of 12/12/24 2149   Pneumonia of right lung due to infectious organism, unspecified part of lung        Medical Decision Making    History obtained with the patient and her son.  Records including labs, imaging, notes independently reviewed by me.  Presentation concerning for possible worsening pericardial effusion, pulmonary embolism, ACS, pneumonia.  Cardiac labs sent.  With the expectation that we would need to reimage her chest and the patient's contrast allergy, patient was given the 4-hour accelerated prep.  Viral swabs negative for COVID, flu, RSV.  Troponin series normal and stable at 3.  EKG without evidence of acute injury pattern.  Low suspicion for ACS at this time.  CMP with mild hyperglycemia 134 thought to be clinically noncontributory.  BMP within normal limits, CBC with stable chronic anemia.  CT angio of the chest demonstrated improved pericardial effusion, new small bilateral pleural effusions thought to clinically noncontributory.  Patient passed an amatory pulse ox test.    Patient reevaluated at bedside he was comfortably eating fried chicken and states she felt fine.  Shared decision making used with the family patient was subsequently discharged home in satisfactory condition with  prescriptions for antibiotics for pneumonia seen on chest x-ray.  All questions answered and return precautions discussed.      EKG demonstrates normal sinus rhythm at a rate of 94, normal intervals.  No acute injury pattern.  T wave inversions in leads V2 and V3 have been seen on previous EKGs.    Patient and or family in agreement and understanding of treatment plan.  All questions answered.      I reviewed the case with the attending ED  physician. The attending ED physician agrees with the plan. Patient and/or patient´s representative was counseled regarding labs, imaging, likely diagnosis, and plan. All questions were answered.    ED Medications administered this visit:    Medications   diphenhydrAMINE (BENADryl) injection 50 mg (50 mg intravenous Given 12/12/24 1708)   cefTRIAXone (Rocephin) 2 g in dextrose (iso) IV 50 mL (0 g intravenous Stopped 12/12/24 1516)   azithromycin (Zithromax) tablet 500 mg (500 mg oral Given 12/12/24 1456)   iohexol (OMNIPaque) 350 mg iodine/mL solution 60 mL (60 mL intravenous Given 12/12/24 1822)       New Prescriptions from this visit:    Discharge Medication List as of 12/12/2024  9:10 PM        START taking these medications    Details   cefdinir (Omnicef) 300 mg capsule Take 1 capsule (300 mg) by mouth 2 times a day for 7 days., Starting Thu 12/12/2024, Until Thu 12/19/2024, Normal      doxycycline (Vibramycin) 100 mg capsule Take 1 capsule (100 mg) by mouth 2 times a day for 7 days. Take with at least 8 ounces (large glass) of water, do not lie down for 30 minutes after, Starting Thu 12/12/2024, Until Thu 12/19/2024, Normal             Follow-up:  Jose Sandra MD  [address]  [facility] [address] [phone]      Keep appointment/echocardiogram as scheduled    Mily Bassett MD  [address] [facility] [address] [phone]    In 1 week          Final Impression:   1. Pneumonia of right lung due to infectious organism, unspecified part of lung          (Please note that portions of this note were completed with a voice recognition program.  Efforts were made to edit the dictations but occasionally words are mis-transcribed.)     Malik Zayas MD  Resident  12/12/24 2972

## 2024-12-13 LAB
ATRIAL RATE: 94 BPM
P AXIS: 58 DEGREES
P OFFSET: 186 MS
P ONSET: 136 MS
PR INTERVAL: 182 MS
Q ONSET: 227 MS
QRS COUNT: 15 BEATS
QRS DURATION: 64 MS
QT INTERVAL: 370 MS
QTC CALCULATION(BAZETT): 462 MS
QTC FREDERICIA: 429 MS
R AXIS: -11 DEGREES
T AXIS: 42 DEGREES
T OFFSET: 412 MS
VENTRICULAR RATE: 94 BPM

## 2024-12-13 NOTE — DISCHARGE INSTRUCTIONS
Please keep your upcoming appointments. If you develop worsening shortness of breath, chest pain, or other worrisome symptoms, return to the ED.

## 2024-12-14 LAB
ATRIAL RATE: 101 BPM
P AXIS: 53 DEGREES
P OFFSET: 193 MS
P ONSET: 140 MS
PR INTERVAL: 164 MS
Q ONSET: 222 MS
QRS COUNT: 16 BEATS
QRS DURATION: 72 MS
QT INTERVAL: 308 MS
QTC CALCULATION(BAZETT): 399 MS
QTC FREDERICIA: 366 MS
R AXIS: 76 DEGREES
T AXIS: 39 DEGREES
T OFFSET: 376 MS
VENTRICULAR RATE: 101 BPM

## 2024-12-21 DIAGNOSIS — I31.39 PERICARDIAL EFFUSION (HHS-HCC): ICD-10-CM

## 2024-12-21 DIAGNOSIS — I30.9 ACUTE PERICARDITIS, UNSPECIFIED TYPE (HHS-HCC): ICD-10-CM

## 2024-12-23 ENCOUNTER — APPOINTMENT (OUTPATIENT)
Dept: CARDIOLOGY | Facility: HOSPITAL | Age: 64
End: 2024-12-23
Payer: COMMERCIAL

## 2024-12-23 ENCOUNTER — APPOINTMENT (OUTPATIENT)
Dept: CARDIOLOGY | Facility: CLINIC | Age: 64
End: 2024-12-23
Payer: COMMERCIAL

## 2024-12-26 RX ORDER — COLCHICINE 0.6 MG/1
0.6 TABLET ORAL 2 TIMES DAILY
Qty: 180 TABLET | Refills: 1 | OUTPATIENT
Start: 2024-12-26 | End: 2025-03-26

## 2025-02-06 ENCOUNTER — HOSPITAL ENCOUNTER (OUTPATIENT)
Dept: CARDIOLOGY | Facility: HOSPITAL | Age: 65
Discharge: HOME | End: 2025-02-06
Payer: COMMERCIAL

## 2025-02-06 ENCOUNTER — OFFICE VISIT (OUTPATIENT)
Dept: CARDIOLOGY | Facility: CLINIC | Age: 65
End: 2025-02-06
Payer: COMMERCIAL

## 2025-02-06 VITALS
DIASTOLIC BLOOD PRESSURE: 90 MMHG | HEIGHT: 64 IN | BODY MASS INDEX: 43.02 KG/M2 | HEART RATE: 86 BPM | WEIGHT: 252 LBS | OXYGEN SATURATION: 98 % | SYSTOLIC BLOOD PRESSURE: 120 MMHG

## 2025-02-06 DIAGNOSIS — I31.39 PERICARDIAL EFFUSION (HHS-HCC): ICD-10-CM

## 2025-02-06 DIAGNOSIS — I31.9 PERICARDITIS, UNSPECIFIED CHRONICITY, UNSPECIFIED TYPE (HHS-HCC): ICD-10-CM

## 2025-02-06 PROCEDURE — 99214 OFFICE O/P EST MOD 30 MIN: CPT | Mod: 25 | Performed by: INTERNAL MEDICINE

## 2025-02-06 PROCEDURE — 1159F MED LIST DOCD IN RCRD: CPT | Performed by: INTERNAL MEDICINE

## 2025-02-06 PROCEDURE — 93325 DOPPLER ECHO COLOR FLOW MAPG: CPT | Performed by: INTERNAL MEDICINE

## 2025-02-06 PROCEDURE — 93005 ELECTROCARDIOGRAM TRACING: CPT | Performed by: INTERNAL MEDICINE

## 2025-02-06 PROCEDURE — 93321 DOPPLER ECHO F-UP/LMTD STD: CPT | Performed by: INTERNAL MEDICINE

## 2025-02-06 PROCEDURE — 93325 DOPPLER ECHO COLOR FLOW MAPG: CPT

## 2025-02-06 PROCEDURE — 93308 TTE F-UP OR LMTD: CPT | Performed by: INTERNAL MEDICINE

## 2025-02-06 PROCEDURE — 93010 ELECTROCARDIOGRAM REPORT: CPT | Performed by: INTERNAL MEDICINE

## 2025-02-06 PROCEDURE — 99214 OFFICE O/P EST MOD 30 MIN: CPT | Performed by: INTERNAL MEDICINE

## 2025-02-06 PROCEDURE — 3008F BODY MASS INDEX DOCD: CPT | Performed by: INTERNAL MEDICINE

## 2025-02-06 NOTE — PROGRESS NOTES
Name : Marylou Cordero    : 1960   MRN : [id number]   ENC Date : 25     Reason for visit: Pericardial effusion    Assessment and Plan:  Pericardial effusion: No prior evidence of tamponade physiology.  Etiology uncertain.  No obvious evidence of malignancy.  Plan was to repeat echocardiogram and then see me back but she went to the wrong office.  We will have her go back to echo later today and get the ultrasound done.  If the pericardial fluid has resolved I will discontinue her colchicine.  If it is worsened then we will likely consider diagnostic and therapeutic pericardiocentesis mostly for fluid analysis to make sure were not missing something.  Atypical chest pain: Patient has 2 different descriptions of chest discomfort.  1 is sharp stabbing that only last for a second and another is even more atypical in a sharp excruciating pain in her back last for about 10 seconds and spontaneously resolves typically with change in position.  This mostly occurs at night.  I do not think either of these represent coronary artery disease.  She had cardiac catheterization in 2018 showing no significant CAD.  I do not think an ischemic workup is needed at this point unless her symptoms become more typical.  Patient and daughter were agreeable with that  Disp: Phone follow-up regarding echocardiogram results      HPI:  Patient returns today for follow-up of her pericardial effusion.  She was post to get echocardiogram first and see me in the office but she went to the wrong place.  We are arranging to have her echo done later today.  Patient is tolerating colchicine well without significant diarrhea though she does have some occasionally.  She continues to have some atypical chest discomfort with a sharp stabbing sensation that last for a second or 2 from time to time.  She has no exertional chest discomfort.  Her daughter also said that she will occasionally have a very intense excruciating pain in the middle of  her back that almost always occurs while sleeping and resolves relatively quickly.  Again no exertional symptoms.      Problem List:   Patient Active Problem List   Diagnosis    Pericardial effusion ([facility])    Pericarditis ([facility])        Meds:   Current Outpatient Medications on File Prior to Visit   Medication Sig Dispense Refill    amLODIPine (Norvasc) 10 mg tablet Take 1 tablet (10 mg) by mouth once daily.      calcium carbonate-vitamin D3 500 mg-5 mcg (200 unit) tablet Take 1 tablet by mouth once daily.      cholecalciferol (Vitamin D-3) 5,000 Units tablet Take 1 tablet (5,000 Units) by mouth once daily.      colchicine 0.6 mg tablet Take 1 tablet (0.6 mg) by mouth 2 times a day. 60 tablet 2    cromolyn (Opticrom) 4 % ophthalmic solution Administer 1 drop into both eyes 4 times a day.      diphenhydrAMINE (BENADryl) 25 mg capsule Take 1 capsule (25 mg) by mouth every 8 hours if needed for itching.      doxepin (Zonalon) 5 % cream Apply 1 Application topically 2 times a day. To face      DULoxetine (Cymbalta) 30 mg DR capsule Take 1 capsule (30 mg) by mouth 2 times a day.      folic acid (Folvite) 1 mg tablet Take 2 tablets (2 mg) by mouth once daily.      hydrOXYzine HCL (Atarax) 50 mg tablet Take 1 tablet (50 mg) by mouth every 8 hours if needed for itching.      loratadine (Claritin) 10 mg tablet Take 1 tablet (10 mg) by mouth once daily.      LORazepam (Ativan) 1 mg tablet Take 1 tablet (1 mg) by mouth every 12 hours.      lubricating eye drops ophthalmic solution Administer 1 drop into both eyes if needed for dry eyes.      polysaccharide iron complex (Pro Fe) 180 mg iron capsule Take 1 capsule (391.3 mg) by mouth once daily.      potassium chloride CR 10 mEq ER tablet Take 1 tablet (10 mEq) by mouth 2 times a day.       No current facility-administered medications on file prior to visit.       All:   Allergies   Allergen Reactions    Amoxicillin-Pot Clavulanate Shortness of breath    Iodinated Contrast  Media Itching, Anaphylaxis and Hives    Moxifloxacin Anaphylaxis, Itching, Swelling and Unknown    Escitalopram Other     Hallucinations    Hydrocodone-Acetaminophen Swelling    Rabeprazole Sodium Unknown       Fam Hx: No family history on file.    Soc Hx:   Social History     Socioeconomic History    Marital status:      Spouse name: Not on file    Number of children: Not on file    Years of education: Not on file    Highest education level: Not on file   Occupational History    Not on file   Tobacco Use    Smoking status: Every Day     Types: Cigarettes    Smokeless tobacco: Never   Substance and Sexual Activity    Alcohol use: Not on file    Drug use: Never    Sexual activity: Not on file   Other Topics Concern    Not on file   Social History Narrative    Not on file     Social Drivers of Health     Financial Resource Strain: Patient Declined (11/29/2024)    Overall Financial Resource Strain (CARDIA)     Difficulty of Paying Living Expenses: Patient declined   Food Insecurity: Patient Declined (11/29/2024)    Hunger Vital Sign     Worried About Running Out of Food in the Last Year: Patient declined     Ran Out of Food in the Last Year: Patient declined   Transportation Needs: Patient Declined (11/29/2024)    PRAPARE - Transportation     Lack of Transportation (Medical): Patient declined     Lack of Transportation (Non-Medical): Patient declined   Physical Activity: Not on file   Stress: Not on file   Social Connections: Not on file   Intimate Partner Violence: Patient Declined (11/29/2024)    Humiliation, Afraid, Rape, and Kick questionnaire     Fear of Current or Ex-Partner: Patient declined     Emotionally Abused: Patient declined     Physically Abused: Patient declined     Sexually Abused: Patient declined   Housing Stability: Patient Declined (11/29/2024)    Housing Stability Vital Sign     Unable to Pay for Housing in the Last Year: Patient declined     Number of Times Moved in the Last Year: 0      "Homeless in the Last Year: Patient declined       VS: /90 (BP Location: Right arm, Patient Position: Sitting)   Pulse 86   Ht 1.626 m (5' 4\")   Wt 114 kg (252 lb)   SpO2 98%   BMI 43.26 kg/m²      Physical Exam  Vitals reviewed.   Constitutional:       Appearance: Normal appearance.   Eyes:      Pupils: Pupils are equal, round, and reactive to light.   Neck:      Vascular: No JVD.   Cardiovascular:      Rate and Rhythm: Normal rate and regular rhythm.      Pulses: Normal pulses.      Heart sounds: No murmur heard.     No gallop.      Comments: Trace bilateral pedal edema  Pulmonary:      Effort: No respiratory distress.      Breath sounds: No wheezing or rales.   Abdominal:      General: Abdomen is flat. There is no distension.      Palpations: Abdomen is soft.   Musculoskeletal:         General: No swelling.      Right lower leg: Edema present.      Left lower leg: Edema present.   Neurological:      General: No focal deficit present.      Mental Status: She is alert.   Psychiatric:         Mood and Affect: Mood normal.            Encounter Date: 12/12/24   ECG 12 lead   Result Value    Ventricular Rate 101    Atrial Rate 101    LA Interval 164    QRS Duration 72    QT Interval 308    QTC Calculation(Bazett) 399    P Axis 53    R Axis 76    T Axis 39    QRS Count 16    Q Onset 222    P Onset 140    P Offset 193    T Offset 376    QTC Fredericia 366    Narrative    Sinus tachycardia  Biatrial enlargement  Low voltage QRS  Cannot rule out Anterior infarct (cited on or before 28-NOV-2024)  Abnormal ECG  Confirmed by Renny Perez ([id number]) on 12/22/2024 5:57:09 PM        ECHO: Results for orders placed during the hospital encounter of 11/28/24    Transthoracic Echo (TTE) Complete    Narrative  [facility]  [address], [facility] [address]  Tel [phone] Fax [phone]    TRANSTHORACIC ECHOCARDIOGRAM REPORT    Patient Name:       LIZA GARNETT       Reading Physician:    [id number] " Jose Sandra MD  Study Date:         11/29/2024           Ordering Provider:    [id number] RAFA SMITH  MRN/PID:            [id number]             Fellow:  Accession#:         [id number]         Nurse:  Date of Birth/Age:  1960 / 64 years Sonographer:          Kristen Hinton RDCS  Gender Assigned at  F                    Additional Staff:  Birth:  Height:             160.02 cm            Admit Date:  Weight:             114.31 kg            Admission Status:     Inpatient -  Routine  BSA / BMI:          2.13 m2 / 44.64      Department Location:  [facility] CCU SD  kg/m2  Blood Pressure: 123 /61 mmHg    Study Type:    TRANSTHORACIC ECHO (TTE) COMPLETE  Diagnosis/ICD: Other pericardial effusion (noninflammatory)-I31.39  Indication:    large pericardial effusion, evaluate for evidence of tamponade  CPT Codes:     Echo Complete w Full [id number]  Study Detail: The following Echo studies were performed: 2D, M-Mode, Doppler and  color flow. Technically challenging study due to patient lying in  supine position and body habitus. The patient was asleep.      PHYSICIAN INTERPRETATION:  Left Ventricle: The left ventricular systolic function is normal, with a visually estimated ejection fraction of 60%. There are no regional wall motion abnormalities. The left ventricular cavity size is normal. There is mild increased septal and mildly increased posterior left ventricular wall thickness. There is left ventricular concentric remodeling. Spectral Doppler shows a Grade I (impaired relaxation pattern) of left ventricular diastolic filling with normal left atrial filling pressure.  Left Atrium: The left atrium is normal in size.  Right Ventricle: The right ventricle is normal in size. There is normal right ventricular global systolic function.  Right Atrium: The right atrium is normal in size.  Aortic Valve: The aortic valve is trileaflet. There is no evidence of aortic valve regurgitation. The peak instantaneous gradient of the  aortic valve is 10 mmHg.  Mitral Valve: The mitral valve is normal in structure. There is no evidence of mitral valve regurgitation.  Tricuspid Valve: The tricuspid valve is structurally normal. No evidence of tricuspid regurgitation. The right ventricular systolic pressure is unable to be estimated.  Pulmonic Valve: The pulmonic valve is structurally normal. There is no indication of pulmonic valve regurgitation.  Pericardium: Small to moderate pericardial effusion. The effusion is circumferential. There is no evidence of cardiac tamponade.  Aorta: The aortic root is normal.  Systemic Veins: The inferior vena cava appears mildly dilated.      CONCLUSIONS:  1. The left ventricular systolic function is normal, with a visually estimated ejection fraction of 60%.  2. Spectral Doppler shows a Grade I (impaired relaxation pattern) of left ventricular diastolic filling with normal left atrial filling pressure.  3. There is normal right ventricular global systolic function.  4. Small to moderate pericardial effusion.  5. There is no evidence of cardiac tamponade.  6. The inferior vena cava appears mildly dilated.    QUANTITATIVE DATA SUMMARY:    2D MEASUREMENTS:           Normal Ranges:  LAs:             3.60 cm   (2.7-4.0cm)  IVSd:            1.19 cm   (0.6-1.1cm)  LVPWd:           1.11 cm   (0.6-1.1cm)  LVIDd:           3.36 cm   (3.9-5.9cm)  LVIDs:           2.43 cm  LV Mass Index:   56.2 g/m2  LV % FS          27.7 %      LA VOLUME:                   Normal Ranges:  LA Area A4C:      19.9 cm2  LA Area A2C:      18.4 cm2  LA Volume Index:  24.0 ml/m2  LA Vol A4C:       51.0 ml  LA Vol A2C:       48.0 ml  LA Vol Index BSA: 23.2 ml/m2  LA Vol BP:        52.0 ml      M-MODE MEASUREMENTS:         Normal Ranges:  Ao Root:             3.30 cm (2.0-3.7cm)  AoV Exc:             1.80 cm (1.5-2.5cm)      AORTA MEASUREMENTS:         Normal Ranges:  AoV Exc:            1.80 cm (1.5-2.5cm)  Ao Sinus, d:        3.90 cm  (2.1-3.5cm)  Ao STJ, d:          3.60 cm (1.7-3.4cm)  Asc Ao, d:          4.55 cm (2.1-3.4cm)      LV SYSTOLIC FUNCTION BY 2D PLANIMETRY (MOD):  Normal Ranges:  EF-A4C View:    55 % (>=55%)  EF-A2C View:    56 %  EF-Biplane:     54 %  EF-Visual:      60 %  LV EF Reported: 60 %      LV DIASTOLIC FUNCTION:            Normal Ranges:  MV Peak E:             0.95 m/s   (0.7-1.2 m/s)  MV Peak A:             1.30 m/s   (0.42-0.7 m/s)  E/A Ratio:             0.73       (1.0-2.2)  MV e'                  0.125 m/s  (>8.0)  MV lateral e'          0.15 m/s  MV medial e'           0.10 m/s  E/e' Ratio:            7.58       (<8.0)  PulmV Sys Jann:         49.40 cm/s  PulmV Baig Jann:        42.40 cm/s  PulmV S/D Jann:         1.20      MITRAL VALVE:          Normal Ranges:  MV DT:        176 msec (150-240msec)      AORTIC VALVE:            Normal Ranges:  AoV Vmax:      1.60 m/s  (<=1.7m/s)  AoV Peak PG:   10.2 mmHg (<20mmHg)  LVOT Max Jann:  1.10 m/s  (<=1.1m/s)  LVOT Diameter: 2.00 cm   (1.8-2.4cm)  AoV Area,Vmax: 2.16 cm2  (2.5-4.5cm2)      RIGHT VENTRICLE:  RV Basal 2.80 cm  RV Mid   2.60 cm  RV Major 7.6 cm  TAPSE:   19.9 mm  RV s'    0.11 m/s      Pulmonary Veins:  PulmV Baig Jann: 42.40 cm/s  PulmV S/D Jann:  1.20  PulmV Sys Jann:  49.40 cm/s      [id number] Jose Sandra MD  Electronically signed on 11/29/2024 at 8:46:38 AM        ** Final **      CT Results:  CT angio chest for pulmonary embolism 12/12/2024    Narrative  STUDY:  CT Angiogram of the Chest; 12/12/2024 6:28 pm  INDICATION:  Shortness of breath.  Tachycardia.  Pleuritic chest pain.  COMPARISON:  CTA Chest 11/29/2024;  CT Chest 10/03/2022;  XR Chest 10/03/2022.  ACCESSION NUMBER(S):  [id number]  ORDERING CLINICIAN:  ANN RODRÍGUEZ  TECHNIQUE:  CTA of the chest was performed with intravenous contrast.  Images are reviewed and processed at a workstation according to the CT  angiogram protocol with 3-D and/or MIP post processing imaging  generated.  Omnipaque 350, 60 mL  was administered intravenously.  Automated mA/kV exposure control was utilized and patient examination  was performed in strict accordance with principles of ALARA.  FINDINGS:  Pulmonary arteries are adequately opacified without acute or chronic  filling defects.  The thoracic aorta is normal in course and caliber  without dissection or aneurysm. Aneurysmal ascending aorta at 4.5 cm.  Small pericardial effusion. Cardiomegaly is seen. Main pulmonary  artery 3.4 cm is enlarged consistent with pulmonary artery  hypertension. Mildly enlarged bilateral axillary lymph nodes are seen,  unchanged. For example, right axillary node is 2.4 x 1.2 cm. Several  small prevascular nodes are stable.  There is no acute pneumothorax.  The airways are patent. Trace pleural  effusions are seen.  Lungs are clear without consolidation, interstitial disease, or  suspicious nodules. Subsegmental atelectasis observed within the lower  lobes.  Upper abdomen demonstrates no acute pathology. There is a small hiatal  hernia. Gallbladder absent. 3.7 cm cyst in the left kidney is seen.  There are no acute fractures.  No suspicious bony lesions.    Impression  1. No evidence for acute pulmonary embolism  2. Aneurysmal ascending aorta.  3. Small to moderate pericardial effusion and small bilateral pleural  effusions. The pericardial effusion is mildly decreased in size.  4. Hiatal hernia  Signed by MD Jose Holt MD

## 2025-02-07 ENCOUNTER — TELEPHONE (OUTPATIENT)
Dept: CARDIOLOGY | Facility: HOSPITAL | Age: 65
End: 2025-02-07
Payer: COMMERCIAL

## 2025-02-07 LAB — EJECTION FRACTION: 63 %

## 2025-02-07 NOTE — TELEPHONE ENCOUNTER
Please call patient's daughter and let them know the echocardiogram looks excellent.  Heart muscle function remains normal.  The pericardial effusion has completely resolved.  This is outstanding news.  Recommend discontinue colchicine at this point and get a limited echocardiogram in 3 months to make sure the fluid does not recur.    If patient is agreeable let me know and we can adjust the medication list and placed the order for the limited echo.    SDH

## 2025-02-12 NOTE — TELEPHONE ENCOUNTER
Called patient's daughter Marielena and left a 2nd message with office number for call back. Also attempted to reach patient. No answer, and no voice mail was available.

## 2025-04-16 ENCOUNTER — LAB (OUTPATIENT)
Dept: LAB | Facility: HOSPITAL | Age: 65
End: 2025-04-16
Payer: COMMERCIAL

## 2025-04-16 ENCOUNTER — PRE-ADMISSION TESTING (OUTPATIENT)
Dept: PREADMISSION TESTING | Facility: HOSPITAL | Age: 65
End: 2025-04-16
Payer: COMMERCIAL

## 2025-04-16 VITALS
HEART RATE: 95 BPM | WEIGHT: 229.06 LBS | HEIGHT: 64 IN | DIASTOLIC BLOOD PRESSURE: 90 MMHG | RESPIRATION RATE: 16 BRPM | TEMPERATURE: 96.8 F | OXYGEN SATURATION: 97 % | BODY MASS INDEX: 39.11 KG/M2 | SYSTOLIC BLOOD PRESSURE: 110 MMHG

## 2025-04-16 DIAGNOSIS — Z01.818 ENCOUNTER FOR OTHER PREPROCEDURAL EXAMINATION: Primary | ICD-10-CM

## 2025-04-16 DIAGNOSIS — Z01.818 PRE-OP TESTING: Primary | ICD-10-CM

## 2025-04-16 LAB
ANION GAP SERPL CALC-SCNC: 13 MMOL/L (ref 10–20)
BUN SERPL-MCNC: 12 MG/DL (ref 6–23)
CALCIUM SERPL-MCNC: 9.8 MG/DL (ref 8.6–10.3)
CHLORIDE SERPL-SCNC: 102 MMOL/L (ref 98–107)
CO2 SERPL-SCNC: 28 MMOL/L (ref 21–32)
CREAT SERPL-MCNC: 0.68 MG/DL (ref 0.5–1.05)
EGFRCR SERPLBLD CKD-EPI 2021: >90 ML/MIN/1.73M*2
ERYTHROCYTE [DISTWIDTH] IN BLOOD BY AUTOMATED COUNT: 21.1 % (ref 11.5–14.5)
GLUCOSE SERPL-MCNC: 86 MG/DL (ref 74–99)
HCT VFR BLD AUTO: 39.3 % (ref 36–46)
HGB BLD-MCNC: 12.1 G/DL (ref 12–16)
MCH RBC QN AUTO: 22.7 PG (ref 26–34)
MCHC RBC AUTO-ENTMCNC: 30.8 G/DL (ref 32–36)
MCV RBC AUTO: 74 FL (ref 80–100)
NRBC BLD-RTO: 0 /100 WBCS (ref 0–0)
PLATELET # BLD AUTO: 288 X10*3/UL (ref 150–450)
POTASSIUM SERPL-SCNC: 3.6 MMOL/L (ref 3.5–5.3)
RBC # BLD AUTO: 5.32 X10*6/UL (ref 4–5.2)
SODIUM SERPL-SCNC: 139 MMOL/L (ref 136–145)
WBC # BLD AUTO: 7 X10*3/UL (ref 4.4–11.3)

## 2025-04-16 PROCEDURE — 85027 COMPLETE CBC AUTOMATED: CPT

## 2025-04-16 PROCEDURE — 83036 HEMOGLOBIN GLYCOSYLATED A1C: CPT

## 2025-04-16 PROCEDURE — 80048 BASIC METABOLIC PNL TOTAL CA: CPT

## 2025-04-16 PROCEDURE — 87081 CULTURE SCREEN ONLY: CPT | Mod: STJLAB

## 2025-04-16 RX ORDER — CHLORHEXIDINE GLUCONATE ORAL RINSE 1.2 MG/ML
SOLUTION DENTAL
Qty: 473 ML | Refills: 0 | Status: SHIPPED | OUTPATIENT
Start: 2025-04-16

## 2025-04-16 RX ORDER — MELOXICAM 15 MG/1
15 TABLET ORAL DAILY
COMMUNITY

## 2025-04-16 ASSESSMENT — DUKE ACTIVITY SCORE INDEX (DASI)
CAN YOU WALK A BLOCK OR TWO ON LEVEL GROUND: NO
CAN YOU HAVE SEXUAL RELATIONS: NO
CAN YOU PARTICIPATE IN STRENOUS SPORTS LIKE SWIMMING, SINGLES TENNIS, FOOTBALL, BASKETBALL, OR SKIING: NO
CAN YOU WALK INDOORS, SUCH AS AROUND YOUR HOUSE: YES
CAN YOU DO MODERATE WORK AROUND THE HOUSE LIKE VACUUMING, SWEEPING FLOORS OR CARRYING GROCERIES: NO
CAN YOU DO YARD WORK LIKE RAKING LEAVES, WEEDING OR PUSHING A MOWER: NO
CAN YOU DO HEAVY WORK AROUND THE HOUSE LIKE SCRUBBING FLOORS OR LIFTING AND MOVING HEAVY FURNITURE: NO
CAN YOU CLIMB A FLIGHT OF STAIRS OR WALK UP A HILL: NO
CAN YOU RUN A SHORT DISTANCE: NO
CAN YOU TAKE CARE OF YOURSELF (EAT, DRESS, BATHE, OR USE TOILET): YES

## 2025-04-16 ASSESSMENT — LIFESTYLE VARIABLES: SMOKING_STATUS: SMOKER

## 2025-04-16 ASSESSMENT — ACTIVITIES OF DAILY LIVING (ADL): ADL_SCORE: 7

## 2025-04-16 NOTE — H&P (VIEW-ONLY)
CPM/PAT Evaluation       Name: Marylou Cordero (Marylou Cordero)  /Age: 1960/65 y.o.     In-Person       Chief Complaint: Preop appointment for upcoming knee surgery    HPI    NS is a 64 yo mostly Azeri speaking female who has had history of bilateral knee discomforts over the years. She was determined to have bilateral knee osteoarthritis and underwent a right total knee replacement in .  This surgery was complicated with developing a left lower extremity DVT. She was treated with anticoagulation up until last year prior to brain surgery for meningioma excision- she is doing well form this and follows up with neurosurgery. Until recently her left knee has been bothering her more so and causing limited ROM. Imaging shows left knee OA and subsequently scheduled for left TKR. Presents to CPM today for perioperative risk stratification and optimization.  She is accompanied today by her daughter who is helping with language barrier- the refusal of  consent signed. She denies any recent steroid injections to the knee. Skin is intact to surgical limb.  Denies any numbness tingling down left lower extremity.  Endorses bilateral leg swelling, right worse than left. Otherwise denies any recent illness, fever/chills, chest pains or shortness of breath.     Medical History[1]    Surgical History[2]    Patient  reports that she is not currently sexually active.    Family History[3]    Allergies[4]    Prior to Admission medications    Medication Sig Start Date End Date Taking? Authorizing Provider   amLODIPine (Norvasc) 10 mg tablet Take 1 tablet (10 mg) by mouth once daily.    Historical Provider, MD   chlorhexidine (Peridex) 0.12 % solution Swish and spit 15 ml for 2 doses, 15mL the night before surgery and 15 ml morning of surgery - swish for 30 seconds -DO NOT SWALLOW, SPIT OUT 25   AGUSTO Clay-CNP   cholecalciferol (Vitamin D-3) 5,000 Units tablet Take 1 tablet (5,000 Units) by mouth  once daily.    Historical Provider, MD   cromolyn (Opticrom) 4 % ophthalmic solution Administer 1 drop into both eyes 4 times a day.    Historical Provider, MD   diphenhydrAMINE (BENADryl) 25 mg capsule Take 1 capsule (25 mg) by mouth every 8 hours if needed for itching.    Historical Provider, MD   DULoxetine (Cymbalta) 30 mg DR capsule Take 1 capsule (30 mg) by mouth 2 times a day.    Historical Provider, MD   folic acid (Folvite) 1 mg tablet Take 2 tablets (2 mg) by mouth once daily.    Historical Provider, MD   hydrOXYzine HCL (Atarax) 50 mg tablet Take 1 tablet (50 mg) by mouth every 8 hours if needed for itching.    Historical Provider, MD   loratadine (Claritin) 10 mg tablet Take 1 tablet (10 mg) by mouth once daily.    Historical Provider, MD   LORazepam (Ativan) 1 mg tablet Take 1 tablet (1 mg) by mouth every 12 hours if needed for anxiety.    Historical Provider, MD   lubricating eye drops ophthalmic solution Administer 1 drop into both eyes if needed for dry eyes.    Historical Provider, MD   polysaccharide iron complex (Pro Fe) 180 mg iron capsule Take 1 capsule (391.3 mg) by mouth once daily.    Historical Provider, MD   potassium chloride CR 10 mEq ER tablet Take 1 tablet (10 mEq) by mouth 2 times a day.    Historical Provider, MD   calcium carbonate-vitamin D3 500 mg-5 mcg (200 unit) tablet Take 1 tablet by mouth once daily.  4/16/25  Historical Provider, MD   doxepin (Zonalon) 5 % cream Apply 1 Application topically 2 times a day. To face  4/16/25  Historical Provider, MD JASIEL VIDAL   Constitutional: Negative for fever, chills, or sweats   ENMT: Negative for nasal discharge, congestion, ear pain, mouth pain, throat pain  Respiratory: Negative for cough, wheezing, shortness of breath     Cardiac: Negative for chest pain, dyspnea on exertion, palpitations   + bilateral leg edema, right worse than left    Gastrointestinal: Negative for nausea, vomiting, diarrhea, constipation, abdominal  pain  Genitourinary: Negative for dysuria, flank pain, frequency, hematuria     Musculoskeletal: Positive for decreased ROM, pain, swelling, weakness in left knee    Neurological: Negative for dizziness, confusion, headache  Psychiatric: Negative for mood changes   Skin: Negative for itching, rash, ulcer    Hematologic/Lymph: Negative for bruising, easy bleeding  Allergic/Immunologic: Negative itching, sneezing, swelling      Physical Exam  Constitutional:       Appearance: Normal appearance. She is obese.   HENT:      Head: Normocephalic.      Mouth/Throat:      Mouth: Mucous membranes are moist.   Eyes:      Extraocular Movements: Extraocular movements intact.   Cardiovascular:      Rate and Rhythm: Normal rate and regular rhythm.   Pulmonary:      Effort: Pulmonary effort is normal.      Breath sounds: Normal breath sounds.   Abdominal:      General: Abdomen is flat.      Palpations: Abdomen is soft.   Musculoskeletal:      Cervical back: Normal range of motion.      Left knee: Decreased range of motion.      Right lower le+ Edema present.      Left lower le+ Edema present.   Skin:     General: Skin is warm and dry.   Neurological:      General: No focal deficit present.      Mental Status: She is alert.   Psychiatric:         Mood and Affect: Mood normal.        PAT AIRWAY:   Airway:     Mallampati::  II    Neck ROM::  Full   Bottom has missing teeth from extractions   upper dentures and partials    Testing/Diagnostic:     Echo CONCLUSIONS:   1. The left ventricular systolic function is normal, with a visually estimated ejection fraction of 60-65%.   2. Compared to 2024 study, the pericardial effusion has resolved!.    Lab Results   Component Value Date    WBC 10.3 2024    HGB 9.5 (L) 2024    HCT 33.3 (L) 2024    MCV 71 (L) 2024     (H) 2024     Lab Results   Component Value Date    GLUCOSE 134 (H) 2024    CALCIUM 9.3 2024     2024    K 4.3  "12/12/2024    CO2 23 12/12/2024     12/12/2024    BUN 13 12/12/2024    CREATININE 0.68 12/12/2024     Hemoglobin A1C   Date Value Ref Range Status   08/09/2024 5.5 4.3 - 5.6 % Final     Comment:     American Diabetes Association guidelines indicate that patients with HgbA1c in the range 5.7-6.4% are at increased risk for development of diabetes, and intervention by lifestyle modification may be beneficial. HgbA1c greater or equal to 6.5% is considered diagnostic of diabetes.      Patient Specialist/PCP:   PCP: Dr. Mejia  Cardiology: Dr. Sandra    Neurosurgery: Dr. Iván Quiñones at [facility] ([facility]   [address] [place] [address], [phone])    Visit Vitals  /90   Pulse 95   Temp 36 °C (96.8 °F) (Temporal)   Resp 16   Ht 1.626 m (5' 4\")   Wt 104 kg (229 lb 0.9 oz)   SpO2 97%   BMI 39.32 kg/m²   OB Status Postmenopausal   Smoking Status Every Day   BSA 2.17 m²       DASI Risk Score      Flowsheet Row Pre-Admission Testing from 4/16/2025 in [facility]   Can you take care of yourself (eat, dress, bathe, or use toilet)?  2.75 filed at 04/16/2025 1105   Can you walk indoors, such as around your house? 1.75 filed at 04/16/2025 1105   Can you walk a block or two on level ground?  0 filed at 04/16/2025 1105   Can you climb a flight of stairs or walk up a hill? 0 filed at 04/16/2025 1105   Can you run a short distance? 0 filed at 04/16/2025 1105   Can you do moderate work around the house like vacuuming, sweeping floors or carrying groceries? 0 filed at 04/16/2025 1105   Can you do heavy work around the house like scrubbing floors or lifting and moving heavy furniture?  0 filed at 04/16/2025 1105   Can you do yard work like raking leaves, weeding or pushing a mower? 0 filed at 04/16/2025 1105   Can you have sexual relations? 0 filed at 04/16/2025 1105   Can you participate in strenous sports like swimming, singles tennis, football, basketball, or skiing? 0 filed at " 04/16/2025 1105          Caprini DVT Assessment      Flowsheet Row Pre-Admission Testing from [facility]16/2025 [facility]-Admission (Discharged) from 11/28/2024 in [facility] with Rick Hoffman MD and Michael Paredes MD   DVT Score (IF A SCORE IS NOT CALCULATING, MUST SELECT A BMI TO COMPLETE) 5 filed at 04/16/2025 1104 5 filed at 11/29/2024 0606   BMI (BMI MUST BE CHOSEN) 41-50 (Morbid obesity) filed at 04/16/2025 1104 41-50 (Morbid obesity) filed at 11/29/2024 0606          Modified Frailty Index      Flowsheet Row Pre-Admission Testing from [facility]16/[facility] [place] [facility]   Non-independent functional status (problems with dressing, bathing, personal grooming, or cooking) 0 filed at 04/16/2025 1107   History of diabetes mellitus  0 filed at 04/16/2025 1107   History of COPD 0 filed at 04/16/2025 1107   History of CHF 0.0909 filed at 04/16/2025 1107   History of MI 0 filed at 04/16/2025 1107   History of Percutaneous Coronary Intervention, Cardiac Surgery, or Angina No filed at 04/16/2025 1107   Hypertension requiring the use of medication  0 filed at 04/16/2025 1107   Peripheral vascular disease 0 filed at 04/16/2025 1107   Impaired sensorium (cognitive impairement or loss, clouding, or delirium) 0 filed at 04/16/2025 1107   TIA or CVA withouy residual deficit 0 filed at 04/16/2025 1107   Cerebrovascular accident with deficit 0 filed at 04/16/2025 1107   Modified Frailty Index Calculator .0909 filed at 04/16/2025 1107          [id number] Stroke Risk Points  Current as of just now        N/A 0 to 9 Points:      Last Change: N/A          The [id number] risk score (Lip ASHLEY, et al. 2009. © 2010 American College of Chest Physicians) quantifies the risk of stroke for a patient with atrial fibrillation. For patients without atrial fibrillation or under the age of 18 this score appears as N/A. Higher score values generally indicate higher risk of stroke.        This score is  not applicable to this patient. Components are not calculated.          Revised Cardiac Risk Index      Flowsheet Row Pre-Admission Testing from 4/16/2025 in [facility]   High-Risk Surgery (Intraperitoneal, Intrathoracic,Suprainguinal vascular) 0 filed at 04/16/2025 1106   History of ischemic heart disease (History of MI, History of positive exercuse test, Current chest paint considered due to myocardial ischemia, Use of nitrate therapy, ECG with pathological Q Waves) 0 filed at 04/16/2025 1106   History of congestive heart failure (pulmonary edemia, bilateral rales or S3 gallop, Paroxysmal nocturnal dyspnea, CXR showing pulmonary vascular redistribution) 1 filed at 04/16/2025 1106   History of cerebrovascular disease (Prior TIA or stroke) 0 filed at 04/16/2025 1106   Pre-operative insulin treatment 0 filed at 04/16/2025 1106   Pre-operative creatinine>2 mg/dl 0 filed at 04/16/2025 1106   Revised Cardiac Risk Calculator 1 filed at 04/16/2025 1106          Apfel Simplified Score      Flowsheet Row Pre-Admission Testing from 4/16/2025 in [facility]   Smoking status 0 filed at 04/16/2025 1106   History of motion sickness or PONV  0 filed at 04/16/2025 1106   Use of postoperative opioids 1 filed at 04/16/2025 1106   Gender - Female 1=Yes filed at 04/16/2025 1106   Apfel Simplified Score Calculator 2 filed at 04/16/2025 1106          Risk Analysis Index Results This Encounter         4/16/2025  1107             Do you live in a place other than your own home?: 0    When did you begin living in the place you are currently residing?: Greater than one year ago    Any kidney failure, kidney not working well, or seeing a kidney doctor (nephrologist)? If yes, was this for kidney stones or another problem?: 0 No    Any history of chronic (long-term) congestive heart failure (CHF)?: 0 No    Any shortness of breath when resting?: 0 No    In the past five years, have you been diagnosed with or  treated for cancer?: No    During the last 3 months has it become difficult for you to remember things or organize your thoughts?: 0 No    Have you lost weight of 10 pounds or more in the past 3 months without trying?: 0 No    Do you have any loss of appetitie?: 0 No    Getting Around (Mobility): 1 Needs help from cane, walker, or scooter    Eatin Needs help preparing meals    Toiletin Can use toilet without any help    Personal Hygiene (Bathing, Hand Washing, Changing Clothes): 4 Totally dependent on others to shower or bathe    [facility] Cancer History: Patient does not indicate history of cancer    Total Risk Analysis Index Score Without Cancer: 26    Total Risk Analysis Index Score: 26          Stop Bang Score      Flowsheet Row Pre-Admission Testing from 2025 in [facility]   Do you snore loudly? 0 filed at 2025 1104   Do you often feel tired or fatigued after your sleep? 0 filed at 2025 1104   Has anyone ever observed you stop breathing in your sleep? 0 filed at 2025 1104   Do you have or are you being treated for high blood pressure? 0 filed at 2025 1104   Recent BMI (Calculated) 39.3 filed at 2025 1104   Is BMI greater than 35 kg/m2? 1=Yes filed at 2025 1104   Age older than 50 years old? 1=Yes filed at 2025 1104   Is your neck circumference greater than 17 inches (Male) or 16 inches (Female)? 0 filed at 2025 1104   Gender - Male 0=No filed at 2025 1104   STOP-BANG Total Score 2 filed at 2025 1104          Prodigy: High Risk  Total Score: 8              Prodigy Age Score           ARISCAT Score for Postoperative Pulmonary Complications      Flowsheet Row Pre-Admission Testing from 2025 in [facility]   Age Calculated Score 3 filed at 2025 1108   Preoperative SpO2 0 filed at 2025 1108   Respiratory infection in the last month Either upper or lower (i.e., URI, bronchitis, pneumonia), with fever and  antibiotic treatment 0 filed at 04/16/2025 1108   Preoperative anemia (Hgb less than 10 g/dl) 0 filed at 04/16/2025 1108   Surgical incision  0 filed at 04/16/2025 1108   Duration of surgery  16 filed at 04/16/2025 1108   Emergency Procedure  0 filed at 04/16/2025 1108   ARISCAT Total Score  19 filed at 04/16/2025 1108          Joanne Perioperative Risk for Myocardial Infarction or Cardiac Arrest (MIRELLA)      Flowsheet Row Pre-Admission Testing from 4/16/2025 in [facility]   Calculated Age Score 1.34 filed at 04/16/2025 1109   Functional Status  0.65 filed at 04/16/2025 1109   ASA Class  -3.29 filed at 04/16/2025 1109   Creatinine 0 filed at 04/16/2025 1109   Type of Procedure  0.80 filed at 04/16/2025 1109   MIRELLA Total Score  -5.75 filed at 04/16/2025 1109   MIRELLA % 0.32 filed at 04/16/2025 1109            Assessment and Plan:     Pre-Op  65-year-old female scheduled for left total knee replacement on 4/30/2025 with Dr. Vaca. Blood work and MRSA ordered- oral chlorahexidine prescribed.  Previous EKG on file from February 2025.  She has been instructed to notify surgeon if any new skin changes occur to surgical limb prior to surgery. Otherwise no further orders indicated.     Cardiac  -Hypertension, compliant with Norvasc  -Hyperlipidemia  -Coronary artery disease status post PCI with stent  -Pericarditis with pericardial effusion, remote, recent echo shows LVEF 60% and no evidence of PE  -Aneurysmal ascending aorta  -Leg edema, bilateral  - Follows with cardiology last seen on 2/6/2025  DASI Score:     MET Score:    RCRI  1 which is 6% 30 day risk of MACE (risk for cardiac death, nonfatal myocardial infarction, and nonfactal cardiac arrest)  MIRELLA score which indicates a   0.32 % risk of intraoperative or 30-day postoperative MACE    Pulmonary  The patient is current tobacco user.  Advised to quit smoking to improve overall health and to decrease risks for anesthesia related complications as well  as postoperative wound healing complications.  Smoking cessation educational handout provided to patient during appointment.    Preoperative deep breathing educational handout provided to patient.  STOP BAN  points which is a low risk for moderate to severe ASUNCION  ARISCAT:   19   points which is a low (1.6%) risk of in-hospital post-op pulmonary complications     Neuro  Meningioma, s/p craniotomy surgery Aug 2024  Migraines  -follows with neurosurgeon     Endocrine  BMI 39.32  - Preoperative hemoglobin A1c ordered    Hemoglobin A1C   Date Value Ref Range Status   2024 5.5 4.3 - 5.6 % Final     Comment:     American Diabetes Association guidelines indicate that patients with HgbA1c in the range 5.7-6.4% are at increased risk for development of diabetes, and intervention by lifestyle modification may be beneficial. HgbA1c greater or equal to 6.5% is considered diagnostic of diabetes.      GI  Hiatal hernia  GERD  History of cholecystectomy  Apfel: 2 points 39% risk for post operative N/V    /Renal  Counseled on avoiding NSAIDs, adequate hydration    Musculoskeletal   Osteoarthritis, history of right total knee replacement    Hematologic  History of DVT, in left LE, s/p right TKR in   Iron deficiency anemia, takes oral iron supplements    due to high Caprini score of 5 patient is at HIGH risk for perioperative DVT, informative education handout provided    Immuno  Rheumatoid arthritis, taking Mobic for symptoms  Lupus  -follows with PCP    Psychiatric  Depression, taking Cymbalta  Anxiety, takes Ativan as needed    Skin check: Patient was instructed to make surgeon aware of any skin changes/concerns prior to surgery.     Anesthesia:  Patient denies any anesthesia complications.   -Underwent craniotomy surgery at [facility] on 2024, no significant anesthesia events documented, see notes under Care Everywhere  ASA 3: A to review    See risk scores as previously documented.             [1]   Past Medical History:  Diagnosis Date    Anemia     Anxiety     Arthritis     BMI 39.0-39.9,adult     Coronary artery disease     Depression     DVT (deep venous thrombosis) (Multi)     GERD (gastroesophageal reflux disease)     Hypertension     Joint pain     Meningioma (Multi)     Migraines     Pericardial effusion ([facility])     Pericarditis ([facility]-HCC)     Rheumatoid arthritis     SLE (systemic lupus erythematosus) (Multi)     Smoker    [2]   Past Surgical History:  Procedure Laterality Date    BRAIN SURGERY      CARDIAC CATHETERIZATION       SECTION, LOW TRANSVERSE      CHOLECYSTECTOMY      COLONOSCOPY      CORONARY ANGIOPLASTY      CORONARY STENT PLACEMENT      HERNIA REPAIR      JOINT REPLACEMENT      right total knee replacement    KNEE ARTHROPLASTY      OTHER SURGICAL HISTORY  2016    Incision Of Perianal Abscess    OTHER SURGICAL HISTORY      Meningioma resection   [3]   Family History  Problem Relation Name Age of Onset    Arthritis Mother      Diabetes Father     [4]   Allergies  Allergen Reactions    Amoxicillin-Pot Clavulanate Shortness of breath    Iodinated Contrast Media Itching, Anaphylaxis and Hives    Moxifloxacin Anaphylaxis, Itching, Swelling and Unknown    Escitalopram Hallucinations    Hydrocodone-Acetaminophen Swelling    Rabeprazole Sodium Unknown

## 2025-04-16 NOTE — PREPROCEDURE INSTRUCTIONS
Thank you for visiting [facility] at [facility]. If you have any changes to your health condition, please call the SURGEON's office to alert them and give them details of your symptoms.        Preoperative Brain Exercises    What are brain exercises?  A brain exercise is any activity that engages your thinking (cognitive) skills.    What types of activities are considered brain exercises?  Jigsaw puzzles, crossword puzzles, word jumble, memory games, word search, and many more.  Many can be found free online or on your phone via a mobile uriel.    Why should I do brain exercises before my surgery?  More recent research has shown brain exercise before surgery can lower the risk of postoperative delirium (confusion) which can be especially important for older adults.  Patients who did brain exercises for 5 to 10 hours the days before surgery, cut their risk of postoperative delirium in half up to 1 week after surgery.      Preoperative Deep Breathing Exercises    Why it is important to do deep breathing exercises before my surgery?  Deep breathing exercises strengthen your breathing muscles.  This helps you to recover after your surgery and decreases the chance of breathing complications.    How are the deep breathing exercises done?  Sit straight with your back supported.  Breathe in deeply and slowly through your nose. Your lower rib cage should expand and your abdomen may move forward.  Hold that breath for 3 to 5 seconds.  Breathe out through pursed lips, slowly and completely.  Rest and repeat 10 times every hour while awake.  Rest longer if you become dizzy or lightheaded.      Patient and Family Education   Ways You Can Help Prevent Blood Clots     This handout explains some simple things you can do to help prevent blood clots.      Blood clots are blockages that can form in the body's veins. When a blood clot forms in your deep veins, it may be called a deep vein thrombosis, or DVT for short. Blood clots can  happen in any part of the body where blood flows, but they are most common in the arms and legs. If a piece of a blood clot breaks free and travels to the lungs, it is called a pulmonary embolus (PE). A PE can be a very serious problem.      Being in the hospital or having surgery can raise your chances of getting a blood clot because you may not be well enough to move around as much as you normally do.      Ways you can help prevent blood clots in the hospital         Wearing SCDs. SCDs stands for Sequential Compression Devices.   SCDs are special sleeves that wrap around your legs  They attach to a pump that fills them with air to gently squeeze your legs every few minutes.   This helps return the blood in your legs to your heart.   SCDs should only be taken off when walking or bathing.   SCDs may not be comfortable, but they can help save your life.               Wearing compression stockings - if your doctor orders them. These special snug fitting stockings gently squeeze your legs to help blood flow.       Walking. Walking helps move the blood in your legs.   If your doctor says it is ok, try walking the halls at least   5 times a day. Ask us to help you get up, so you don't fall.      Taking any blood thinning medicines your doctor orders.          ©[facility]; 3/23        Ways you can help prevent blood clots at home       Wearing compression stockings - if your doctor orders them. ? Walking - to help move the blood in your legs.       Taking any blood thinning medicines your doctor orders.      Signs of a blood clot or PE      Tell your doctor or nurse know right away if you have of the problems listed below.    If you are at home, seek medical care right away. Call 911 for chest pain or problems breathing.          Signs of a blood clot (DVT) - such as pain,  swelling, redness or warmth in your arm or leg      Signs of a pulmonary embolism (PE) - such as chest     pain or feeling short of breath                                Patient and Family Education Quitting Smoking or Tobacco       Recognizing Dangerous Situations:   Alcohol use during the first month after quitting   Being around smoke or someone who smokes    Times situation routinely smoked   Triggers: car, breaks, coffee, when awakening, social events     Coping Skills:   Learning new ways to manage stress   Exercising   Relaxation breathing   Change routines   Distraction techniques     Websites:   Smoke-Free - offers free text messages and an uriel to help you quit. Info includes eating and mood issues that may come with quitting. There is a Live Helpline to talk to an expert. Go to smokefree.gov     Become an Ex-Smoker - the free EX Plan is based on scientific research and useful advice from ex-smokers. It isn't just about quitting smoking. It's about re-learning life without cigarettes using a 3-step program. Go to becomeanex.[place]     Centers for Disease Control - offer many suggestions for helping you quit. Includes a Quit Guide and real-life stories. There are sections for specific groups such as LGBT, , different ethnic groups, and pregnant women. Go to cdc.gov/tobacco/campaign/tips     Other Resources:     [place] Tobacco Quit Line - call 1-800-QUIT-NOW or 1-277.125.4320.   [employer] 2-1-1 - to find local programs and resources. Call 211 or go to 211.[place]. [facility] Tobacco Cessation Program - call 905-930-9715.   American Lung Association - offers classes for quitting smoking. Some places may charge a fee. For a list of classes, go to lung.org or call 0-676-LUNG-USA.     Some things to think about:   The health benefits of quitting smoking can help most of the major parts of your body.   There is no safe amount of cigarette smoke. Quitting smoking can add years to your life.   When you quit, you'll also protect your loved ones from dangerous secondhand smoke.   Make a plan, join a support group, and talk to your physician to  assist in quitting smoking.     ©[facility], 11/20; [id number] (6/22)

## 2025-04-16 NOTE — CPM/PAT H&P
CPM/PAT Evaluation       Name: Marylou Cordero (Marylou Cordero)  /Age: 1960/65 y.o.     In-Person       Chief Complaint: Preop appointment for upcoming knee surgery    HPI    NS is a 66 yo mostly Irish speaking female who has had history of bilateral knee discomforts over the years. She was determined to have bilateral knee osteoarthritis and underwent a right total knee replacement in .  This surgery was complicated with developing a left lower extremity DVT. She was treated with anticoagulation up until last year prior to brain surgery for meningioma excision- she is doing well form this and follows up with neurosurgery. Until recently her left knee has been bothering her more so and causing limited ROM. Imaging shows left knee OA and subsequently scheduled for left TKR. Presents to CPM today for perioperative risk stratification and optimization.  She is accompanied today by her daughter who is helping with language barrier- the refusal of  consent signed. She denies any recent steroid injections to the knee. Skin is intact to surgical limb.  Denies any numbness tingling down left lower extremity.  Endorses bilateral leg swelling, right worse than left. Otherwise denies any recent illness, fever/chills, chest pains or shortness of breath.     Medical History[1]    Surgical History[2]    Patient  reports that she is not currently sexually active.    Family History[3]    Allergies[4]    Prior to Admission medications    Medication Sig Start Date End Date Taking? Authorizing Provider   amLODIPine (Norvasc) 10 mg tablet Take 1 tablet (10 mg) by mouth once daily.    Historical Provider, MD   chlorhexidine (Peridex) 0.12 % solution Swish and spit 15 ml for 2 doses, 15mL the night before surgery and 15 ml morning of surgery - swish for 30 seconds -DO NOT SWALLOW, SPIT OUT 25   AGUSTO Clay-CNP   cholecalciferol (Vitamin D-3) 5,000 Units tablet Take 1 tablet (5,000 Units) by mouth  once daily.    Historical Provider, MD   cromolyn (Opticrom) 4 % ophthalmic solution Administer 1 drop into both eyes 4 times a day.    Historical Provider, MD   diphenhydrAMINE (BENADryl) 25 mg capsule Take 1 capsule (25 mg) by mouth every 8 hours if needed for itching.    Historical Provider, MD   DULoxetine (Cymbalta) 30 mg DR capsule Take 1 capsule (30 mg) by mouth 2 times a day.    Historical Provider, MD   folic acid (Folvite) 1 mg tablet Take 2 tablets (2 mg) by mouth once daily.    Historical Provider, MD   hydrOXYzine HCL (Atarax) 50 mg tablet Take 1 tablet (50 mg) by mouth every 8 hours if needed for itching.    Historical Provider, MD   loratadine (Claritin) 10 mg tablet Take 1 tablet (10 mg) by mouth once daily.    Historical Provider, MD   LORazepam (Ativan) 1 mg tablet Take 1 tablet (1 mg) by mouth every 12 hours if needed for anxiety.    Historical Provider, MD   lubricating eye drops ophthalmic solution Administer 1 drop into both eyes if needed for dry eyes.    Historical Provider, MD   polysaccharide iron complex (Pro Fe) 180 mg iron capsule Take 1 capsule (391.3 mg) by mouth once daily.    Historical Provider, MD   potassium chloride CR 10 mEq ER tablet Take 1 tablet (10 mEq) by mouth 2 times a day.    Historical Provider, MD   calcium carbonate-vitamin D3 500 mg-5 mcg (200 unit) tablet Take 1 tablet by mouth once daily.  4/16/25  Historical Provider, MD   doxepin (Zonalon) 5 % cream Apply 1 Application topically 2 times a day. To face  4/16/25  Historical Provider, MD JASIEL VIDAL   Constitutional: Negative for fever, chills, or sweats   ENMT: Negative for nasal discharge, congestion, ear pain, mouth pain, throat pain  Respiratory: Negative for cough, wheezing, shortness of breath     Cardiac: Negative for chest pain, dyspnea on exertion, palpitations   + bilateral leg edema, right worse than left    Gastrointestinal: Negative for nausea, vomiting, diarrhea, constipation, abdominal  pain  Genitourinary: Negative for dysuria, flank pain, frequency, hematuria     Musculoskeletal: Positive for decreased ROM, pain, swelling, weakness in left knee    Neurological: Negative for dizziness, confusion, headache  Psychiatric: Negative for mood changes   Skin: Negative for itching, rash, ulcer    Hematologic/Lymph: Negative for bruising, easy bleeding  Allergic/Immunologic: Negative itching, sneezing, swelling      Physical Exam  Constitutional:       Appearance: Normal appearance. She is obese.   HENT:      Head: Normocephalic.      Mouth/Throat:      Mouth: Mucous membranes are moist.   Eyes:      Extraocular Movements: Extraocular movements intact.   Cardiovascular:      Rate and Rhythm: Normal rate and regular rhythm.   Pulmonary:      Effort: Pulmonary effort is normal.      Breath sounds: Normal breath sounds.   Abdominal:      General: Abdomen is flat.      Palpations: Abdomen is soft.   Musculoskeletal:      Cervical back: Normal range of motion.      Left knee: Decreased range of motion.      Right lower le+ Edema present.      Left lower le+ Edema present.   Skin:     General: Skin is warm and dry.   Neurological:      General: No focal deficit present.      Mental Status: She is alert.   Psychiatric:         Mood and Affect: Mood normal.        PAT AIRWAY:   Airway:     Mallampati::  II    Neck ROM::  Full   Bottom has missing teeth from extractions   upper dentures and partials    Testing/Diagnostic:     Echo CONCLUSIONS:   1. The left ventricular systolic function is normal, with a visually estimated ejection fraction of 60-65%.   2. Compared to 2024 study, the pericardial effusion has resolved!.    Lab Results   Component Value Date    WBC 10.3 2024    HGB 9.5 (L) 2024    HCT 33.3 (L) 2024    MCV 71 (L) 2024     (H) 2024     Lab Results   Component Value Date    GLUCOSE 134 (H) 2024    CALCIUM 9.3 2024     2024    K 4.3  "12/12/2024    CO2 23 12/12/2024     12/12/2024    BUN 13 12/12/2024    CREATININE 0.68 12/12/2024     Hemoglobin A1C   Date Value Ref Range Status   08/09/2024 5.5 4.3 - 5.6 % Final     Comment:     American Diabetes Association guidelines indicate that patients with HgbA1c in the range 5.7-6.4% are at increased risk for development of diabetes, and intervention by lifestyle modification may be beneficial. HgbA1c greater or equal to 6.5% is considered diagnostic of diabetes.      Patient Specialist/PCP:   PCP: Dr. Mejia  Cardiology: Dr. Sandra    Neurosurgery: Dr. Iván Quiñones at [facility] ([facility]   [address] [place] [address], [phone])    Visit Vitals  /90   Pulse 95   Temp 36 °C (96.8 °F) (Temporal)   Resp 16   Ht 1.626 m (5' 4\")   Wt 104 kg (229 lb 0.9 oz)   SpO2 97%   BMI 39.32 kg/m²   OB Status Postmenopausal   Smoking Status Every Day   BSA 2.17 m²       DASI Risk Score      Flowsheet Row Pre-Admission Testing from 4/16/2025 in [facility]   Can you take care of yourself (eat, dress, bathe, or use toilet)?  2.75 filed at 04/16/2025 1105   Can you walk indoors, such as around your house? 1.75 filed at 04/16/2025 1105   Can you walk a block or two on level ground?  0 filed at 04/16/2025 1105   Can you climb a flight of stairs or walk up a hill? 0 filed at 04/16/2025 1105   Can you run a short distance? 0 filed at 04/16/2025 1105   Can you do moderate work around the house like vacuuming, sweeping floors or carrying groceries? 0 filed at 04/16/2025 1105   Can you do heavy work around the house like scrubbing floors or lifting and moving heavy furniture?  0 filed at 04/16/2025 1105   Can you do yard work like raking leaves, weeding or pushing a mower? 0 filed at 04/16/2025 1105   Can you have sexual relations? 0 filed at 04/16/2025 1105   Can you participate in strenous sports like swimming, singles tennis, football, basketball, or skiing? 0 filed at " 04/16/2025 1105          Caprini DVT Assessment      Flowsheet Row Pre-Admission Testing from 4/16/2025 in [facility] ED to Hosp-Admission (Discharged) from 11/28/2024 in [facility] 2 with Rick Hoffman MD and Michael Paredes MD   DVT Score (IF A SCORE IS NOT CALCULATING, MUST SELECT A BMI TO COMPLETE) 5 filed at 04/16/2025 1104 5 filed at 11/29/2024 0606   BMI (BMI MUST BE CHOSEN) 41-50 (Morbid obesity) filed at 04/16/2025 1104 41-50 (Morbid obesity) filed at 11/29/2024 0606          Modified Frailty Index      Flowsheet Row Pre-Admission Testing from 4/16/2025 in [facility]   Non-independent functional status (problems with dressing, bathing, personal grooming, or cooking) 0 filed at 04/16/2025 1107   History of diabetes mellitus  0 filed at 04/16/2025 1107   History of COPD 0 filed at 04/16/2025 1107   History of CHF 0.0909 filed at 04/16/2025 1107   History of MI 0 filed at 04/16/2025 1107   History of Percutaneous Coronary Intervention, Cardiac Surgery, or Angina No filed at 04/16/2025 1107   Hypertension requiring the use of medication  0 filed at 04/16/2025 1107   Peripheral vascular disease 0 filed at 04/16/2025 1107   Impaired sensorium (cognitive impairement or loss, clouding, or delirium) 0 filed at 04/16/2025 1107   TIA or CVA withouy residual deficit 0 filed at 04/16/2025 1107   Cerebrovascular accident with deficit 0 filed at 04/16/2025 1107   Modified Frailty Index Calculator .0909 filed at 04/16/2025 1107          [id number] Stroke Risk Points  Current as of just now        N/A 0 to 9 Points:      Last Change: N/A          The [id number] risk score (Lip ASHLEY, et al. 2009. © 2010 American College [employer] Physicians) quantifies the risk of stroke for a patient with atrial fibrillation. For patients without atrial fibrillation or under the age of 18 this score appears as N/A. Higher score values generally indicate higher risk of stroke.        This score is  not applicable to this patient. Components are not calculated.          Revised Cardiac Risk Index      Flowsheet Row Pre-Admission Testing from 4/16/2025 in [facility] [place] [place]   High-Risk Surgery (Intraperitoneal, Intrathoracic,Suprainguinal vascular) 0 filed at 04/16/2025 1106   History of ischemic heart disease (History of MI, History of positive exercuse test, Current chest paint considered due to myocardial ischemia, Use of nitrate therapy, ECG with pathological Q Waves) 0 filed at 04/16/2025 1106   History of congestive heart failure (pulmonary edemia, bilateral rales or S3 gallop, Paroxysmal nocturnal dyspnea, CXR showing pulmonary vascular redistribution) 1 filed at 04/16/2025 1106   History of cerebrovascular disease (Prior TIA or stroke) 0 filed at 04/16/2025 1106   Pre-operative insulin treatment 0 filed at 04/16/2025 1106   Pre-operative creatinine>2 mg/dl 0 filed at 04/16/2025 1106   Revised Cardiac Risk Calculator 1 filed at 04/16/2025 1106          Apfel Simplified Score      Flowsheet Row Pre-Admission Testing from 4/16/2025 in [facility] [place] [place]   Smoking status 0 filed at 04/16/2025 1106   History of motion sickness or PONV  0 filed at 04/16/2025 1106   Use of postoperative opioids 1 filed at 04/16/2025 1106   Gender - Female 1=Yes filed at 04/16/2025 1106   Apfel Simplified Score Calculator 2 filed at 04/16/2025 1106          Risk Analysis Index Results This Encounter         4/16/2025  1107             Do you live in a place other than your own home?: 0    When did you begin living in the place you are currently residing?: Greater than one year ago    Any kidney failure, kidney not working well, or seeing a kidney doctor (nephrologist)? If yes, was this for kidney stones or another problem?: 0 No    Any history of chronic (long-term) congestive heart failure (CHF)?: 0 No    Any shortness of breath when resting?: 0 No    In the past five years, have you been diagnosed with or  treated for cancer?: No    During the last 3 months has it become difficult for you to remember things or organize your thoughts?: 0 No    Have you lost weight of 10 pounds or more in the past 3 months without trying?: 0 No    Do you have any loss of appetitie?: 0 No    Getting Around (Mobility): 1 Needs help from cane, walker, or scooter    Eatin Needs help preparing meals    Toiletin Can use toilet without any help    Personal Hygiene (Bathing, Hand Washing, Changing Clothes): 4 Totally dependent on others to shower or bathe    MCH[facility] Cancer History: Patient does not indicate history of cancer    Total Risk Analysis Index Score Without Cancer: 26    Total Risk Analysis Index Score: 26          Stop Bang Score      Flowsheet Row Pre-Admission Testing [place] [facility]   Do you snore loudly? 0 filed at 2025 1104   Do you often feel tired or fatigued after your sleep? 0 filed at 2025 1104   Has anyone ever observed you stop breathing in your sleep? 0 filed at 2025 1104   Do you have or are you being treated for high blood pressure? 0 filed at 2025 1104   Recent BMI (Calculated) 39.3 filed at 2025 1104   Is BMI greater than 35 kg/m2? 1=Yes filed at 2025 1104   Age older than 50 years old? 1=Yes filed at 2025 1104   Is your neck circumference greater than 17 inches (Male) or 16 inches (Female)? 0 filed at 2025 1104   Gender - Male 0=No filed at 2025 1104   STOP-BANG Total Score 2 filed at 2025 1104          Prodigy: High Risk  Total Score: 8              Prodigy Age Score           ARISCAT Score for Postoperative Pulmonary Complications      Flowsheet Row Pre-Admission Testing from 2025 in [facility]   Age Calculated Score 3 filed at 2025 1108   Preoperative SpO2 0 filed at 2025 1108   Respiratory infection in the last month Either upper or lower (i.e., URI, bronchitis, pneumonia), with fever and  antibiotic treatment 0 filed at 04/16/2025 1108   Preoperative anemia (Hgb less than 10 g/dl) 0 filed at 04/16/2025 1108   Surgical incision  0 filed at 04/16/2025 1108   Duration of surgery  16 filed at 04/16/2025 1108   Emergency Procedure  0 filed at 04/16/2025 1108   ARISCAT Total Score  19 filed at 04/16/2025 1108          Joanne Perioperative Risk for Myocardial Infarction or Cardiac Arrest (MIRELLA)      Flowsheet Row Pre-Admission Testing from 4/16/2025 in [facility]   Calculated Age Score 1.34 filed at 04/16/2025 1109   Functional Status  0.65 filed at 04/16/2025 1109   ASA Class  -3.29 filed at 04/16/2025 1109   Creatinine 0 filed at 04/16/2025 1109   Type of Procedure  0.80 filed at 04/16/2025 1109   MIRELLA Total Score  -5.75 filed at 04/16/2025 1109   MIRELLA % 0.32 filed at 04/16/2025 1109            Assessment and Plan:     Pre-Op  65-year-old female scheduled for left total knee replacement on 4/30/2025 with Dr. Vaca. Blood work and MRSA ordered- oral chlorahexidine prescribed.  Previous EKG on file from February 2025.  She has been instructed to notify surgeon if any new skin changes occur to surgical limb prior to surgery. Otherwise no further orders indicated.     Cardiac  -Hypertension, compliant with Norvasc  -Hyperlipidemia  -Coronary artery disease status post PCI with stent  -Pericarditis with pericardial effusion, remote, recent echo shows LVEF 60% and no evidence of PE  -Aneurysmal ascending aorta  -Leg edema, bilateral  - Follows with cardiology last seen on 2/6/2025  DASI Score:     MET Score:    RCRI  1 which is 6% 30 day risk of MACE (risk for cardiac death, nonfatal myocardial infarction, and nonfactal cardiac arrest)  MIRELLA score which indicates a   0.32 % risk of intraoperative or 30-day postoperative MACE    Pulmonary  The patient is current tobacco user.  Advised to quit smoking to improve overall health and to decrease risks for anesthesia related complications as well  as postoperative wound healing complications.  Smoking cessation educational handout provided to patient during appointment.    Preoperative deep breathing educational handout provided to patient.  STOP BAN  points which is a low risk for moderate to severe ASUNCION  ARISCAT:   19   points which is a low (1.6%) risk of in-hospital post-op pulmonary complications     Neuro  Meningioma, s/p craniotomy surgery Aug 2024  Migraines  -follows with neurosurgeon     Endocrine  BMI 39.32  - Preoperative hemoglobin A1c ordered    Hemoglobin A1C   Date Value Ref Range Status   2024 5.5 4.3 - 5.6 % Final     Comment:     American Diabetes Association guidelines indicate that patients with HgbA1c in the range 5.7-6.4% are at increased risk for development of diabetes, and intervention by lifestyle modification may be beneficial. HgbA1c greater or equal to 6.5% is considered diagnostic of diabetes.      GI  Hiatal hernia  GERD  History of cholecystectomy  Apfel: 2 points 39% risk for post operative N/V    /Renal  Counseled on avoiding NSAIDs, adequate hydration    Musculoskeletal   Osteoarthritis, history of right total knee replacement    Hematologic  History of DVT, in left LE, s/p right TKR in   Iron deficiency anemia, takes oral iron supplements    due to high Caprini score of 5 patient is at HIGH risk for perioperative DVT, informative education handout provided    Immuno  Rheumatoid arthritis, taking Mobic for symptoms  Lupus  -follows with PCP    Psychiatric  Depression, taking Cymbalta  Anxiety, takes Ativan as needed    Skin check: Patient was instructed to make surgeon aware of any skin changes/concerns prior to surgery.     Anesthesia:  Patient denies any anesthesia complications.   -Underwent craniotomy surgery at [facility] on 2024, no significant anesthesia events documented, see notes under Care Everywhere  ASA 3: A to review    See risk scores as previously documented.             [1]   Past Medical History:  Diagnosis Date    Anemia     Anxiety     Arthritis     BMI 39.0-39.9,adult     Coronary artery disease     Depression     DVT (deep venous thrombosis) (Multi)     GERD (gastroesophageal reflux disease)     Hypertension     Joint pain     Meningioma (Multi)     Migraines     Pericardial effusion ([facility]-H[facility])     Pericarditis ([facility]-HCC)     Rheumatoid arthritis     SLE (systemic lupus erythematosus) (Multi)     Smoker    [2]   Past Surgical History:  Procedure Laterality Date    BRAIN SURGERY      CARDIAC CATHETERIZATION       SECTION, LOW TRANSVERSE      CHOLECYSTECTOMY      COLONOSCOPY      CORONARY ANGIOPLASTY      CORONARY STENT PLACEMENT      HERNIA REPAIR      JOINT REPLACEMENT      right total knee replacement    KNEE ARTHROPLASTY      OTHER SURGICAL HISTORY  2016    Incision Of Perianal Abscess    OTHER SURGICAL HISTORY      Meningioma resection   [3]   Family History  Problem Relation Name Age of Onset    Arthritis Mother      Diabetes Father     [4]   Allergies  Allergen Reactions    Amoxicillin-Pot Clavulanate Shortness of breath    Iodinated Contrast Media Itching, Anaphylaxis and Hives    Moxifloxacin Anaphylaxis, Itching, Swelling and Unknown    Escitalopram Hallucinations    Hydrocodone-Acetaminophen Swelling    Rabeprazole Sodium Unknown

## 2025-04-16 NOTE — PREPROCEDURE INSTRUCTIONS
Medication List            Accurate as of April 16, 2025 11:38 AM. Always use your most recent med list.                amLODIPine 10 mg tablet  Commonly known as: Norvasc  Medication Adjustments for Surgery: Take/Use as prescribed     chlorhexidine 0.12 % solution  Commonly known as: Peridex  Swish and spit 15 ml for 2 doses, 15mL the night before surgery and 15 ml morning of surgery - swish for 30 seconds -DO NOT SWALLOW, SPIT OUT  Medication Adjustments for Surgery: Take/Use as prescribed     cholecalciferol 125 mcg (5,000 units) tablet  Commonly known as: Vitamin D-3  Additional Medication Adjustments for Surgery: Take last dose 7 days before surgery     cromolyn 4 % ophthalmic solution  Commonly known as: Opticrom  Medication Adjustments for Surgery: Take/Use as prescribed     diphenhydrAMINE 25 mg capsule  Commonly known as: BENADryl  Medication Adjustments for Surgery: Take/Use as prescribed     DULoxetine 30 mg DR capsule  Commonly known as: Cymbalta  Medication Adjustments for Surgery: Take/Use as prescribed     folic acid 1 mg tablet  Commonly known as: Folvite  Additional Medication Adjustments for Surgery: Take last dose 7 days before surgery     hydrOXYzine HCL 50 mg tablet  Commonly known as: Atarax  Medication Adjustments for Surgery: Do Not take on the morning of surgery     loratadine 10 mg tablet  Commonly known as: Claritin  Medication Adjustments for Surgery: Take/Use as prescribed     LORazepam 1 mg tablet  Commonly known as: Ativan  Medication Adjustments for Surgery: Take/Use as prescribed     lubricating eye drops ophthalmic solution  Medication Adjustments for Surgery: Do Not take on the morning of surgery     meloxicam 15 mg tablet  Commonly known as: Mobic  Additional Medication Adjustments for Surgery: Take last dose 7 days before surgery     potassium chloride CR 10 mEq ER tablet  Commonly known as: Klor-Con  Medication Adjustments for Surgery: Do Not take on the morning of surgery      Pro Fe 180 mg iron capsule  Generic drug: polysaccharide iron complex  Medication Adjustments for Surgery: Do Not take on the morning of surgery                          PRE-OPERATIVE INSTRUCTIONS    You will receive notification one business day prior to your procedure to confirm your arrival time. It is important that you answer your phone and/or check your messages during this time. If you do not hear from the surgery center by 5 pm. the day before your procedure, please call [phone].     Please enter the building through the Outpatient entrance and take the elevator off the lobby to the 2nd floor then check in at the Outpatient Surgery desk on the 2nd floor.    INSTRUCTIONS:  Talk to your surgeon for instructions if you should stop your aspirin, blood thinner, or diabetes medicines.  DO NOT take any multivitamins or over the counter supplements for 7-10 days before surgery.  If not being admitted, you must have an adult immediately available to drive you home after surgery. We also highly recommend you have someone stay with you for the entire day and night of your surgery.  For children having surgery, a parent or legal guardian must accompany them to the surgery center. If this is not possible, please call [phone] to make additional arrangements.  For adults who are unable to consent or make medical decisions for themselves, a legal guardian or Power of  must accompany them to the surgery center. If this is not possible, please call [phone] to make additional arrangements.  Wear comfortable, loose fitting clothing.  All jewelry and piercings must be removed. If you are unable to remove an item or have a dermal piercing, please be sure to tell the nurse when you arrive for surgery.  Nail polish and make-up must be removed.  Avoid smoking or consuming alcohol for 24 hours before surgery.  To help prevent infection, please take a shower/bath and wash your hair the night before and/or  morning of surgery (or follow other specific bathing instructions provided).    Preoperative Fasting Guidelines    Why must I stop eating and drinking near surgery time?  With sedation, food or liquid in your stomach can enter your lungs causing serious complications  Increases nausea and vomiting    When do I need to stop eating and drinking before my surgery?  Do not eat any solid food after midnight the night before your surgery/procedure unless otherwise instructed by your surgeon.   If you take a GLP-1 medication (ex: Trulicity, Ozempic, Mounjaro, Zepbound, Bydyreon, Tanzeun, Saxenda, Victoza, Adlyxin, Rybelus) please follow other specific instructions provided regarding preoperative          fasting.  You may have up to 13.5 ounces of clear liquid until TWO hours before your instructed arrival time to the hospital.  This includes water, black tea/coffee, (no milk or cream) apple juice, and electrolyte drinks (Gatorade).   You may chew gum until TWO hours before your surgery/procedure         If applicable, notify your surgeons office immediately of any new skin changes that occur to the surgical limb.      If you have any questions or concerns, please call Pre-Admission Testing at [phone].

## 2025-04-17 LAB
EST. AVERAGE GLUCOSE BLD GHB EST-MCNC: 114 MG/DL
HBA1C MFR BLD: 5.6 % (ref ?–5.7)

## 2025-04-18 LAB — STAPHYLOCOCCUS SPEC CULT: NORMAL

## 2025-04-30 ENCOUNTER — HOSPITAL ENCOUNTER (OUTPATIENT)
Facility: HOSPITAL | Age: 65
Discharge: HOME HEALTH | End: 2025-05-02
Attending: ORTHOPAEDIC SURGERY | Admitting: ORTHOPAEDIC SURGERY
Payer: COMMERCIAL

## 2025-04-30 ENCOUNTER — ANESTHESIA (OUTPATIENT)
Dept: OPERATING ROOM | Facility: HOSPITAL | Age: 65
End: 2025-04-30
Payer: COMMERCIAL

## 2025-04-30 ENCOUNTER — ANESTHESIA EVENT (OUTPATIENT)
Dept: OPERATING ROOM | Facility: HOSPITAL | Age: 65
End: 2025-04-30
Payer: COMMERCIAL

## 2025-04-30 ENCOUNTER — APPOINTMENT (OUTPATIENT)
Dept: RADIOLOGY | Facility: HOSPITAL | Age: 65
End: 2025-04-30
Payer: COMMERCIAL

## 2025-04-30 DIAGNOSIS — Z96.652 TOTAL KNEE REPLACEMENT STATUS, LEFT: Primary | ICD-10-CM

## 2025-04-30 PROCEDURE — A9999 DME SUPPLY OR ACCESSORY, NOS: HCPCS | Performed by: ORTHOPAEDIC SURGERY

## 2025-04-30 PROCEDURE — 7100000001 HC RECOVERY ROOM TIME - INITIAL BASE CHARGE: Performed by: ORTHOPAEDIC SURGERY

## 2025-04-30 PROCEDURE — 2500000004 HC RX 250 GENERAL PHARMACY W/ HCPCS (ALT 636 FOR OP/ED): Mod: JZ | Performed by: NURSE PRACTITIONER

## 2025-04-30 PROCEDURE — A27447 PR TOTAL KNEE ARTHROPLASTY: Performed by: STUDENT IN AN ORGANIZED HEALTH CARE EDUCATION/TRAINING PROGRAM

## 2025-04-30 PROCEDURE — 7100000002 HC RECOVERY ROOM TIME - EACH INCREMENTAL 1 MINUTE: Performed by: ORTHOPAEDIC SURGERY

## 2025-04-30 PROCEDURE — 3600000010 HC OR TIME - EACH INCREMENTAL 1 MINUTE - PROCEDURE LEVEL FIVE: Performed by: ORTHOPAEDIC SURGERY

## 2025-04-30 PROCEDURE — 2500000001 HC RX 250 WO HCPCS SELF ADMINISTERED DRUGS (ALT 637 FOR MEDICARE OP): Performed by: ORTHOPAEDIC SURGERY

## 2025-04-30 PROCEDURE — 2500000004 HC RX 250 GENERAL PHARMACY W/ HCPCS (ALT 636 FOR OP/ED): Mod: JZ | Performed by: ORTHOPAEDIC SURGERY

## 2025-04-30 PROCEDURE — 2500000004 HC RX 250 GENERAL PHARMACY W/ HCPCS (ALT 636 FOR OP/ED): Performed by: STUDENT IN AN ORGANIZED HEALTH CARE EDUCATION/TRAINING PROGRAM

## 2025-04-30 PROCEDURE — 2500000004 HC RX 250 GENERAL PHARMACY W/ HCPCS (ALT 636 FOR OP/ED): Mod: JZ | Performed by: ANESTHESIOLOGIST ASSISTANT

## 2025-04-30 PROCEDURE — 3700000002 HC GENERAL ANESTHESIA TIME - EACH INCREMENTAL 1 MINUTE: Performed by: ORTHOPAEDIC SURGERY

## 2025-04-30 PROCEDURE — 64447 NJX AA&/STRD FEMORAL NRV IMG: CPT | Performed by: STUDENT IN AN ORGANIZED HEALTH CARE EDUCATION/TRAINING PROGRAM

## 2025-04-30 PROCEDURE — 2500000005 HC RX 250 GENERAL PHARMACY W/O HCPCS: Mod: JZ | Performed by: ORTHOPAEDIC SURGERY

## 2025-04-30 PROCEDURE — 3700000001 HC GENERAL ANESTHESIA TIME - INITIAL BASE CHARGE: Performed by: ORTHOPAEDIC SURGERY

## 2025-04-30 PROCEDURE — 2500000005 HC RX 250 GENERAL PHARMACY W/O HCPCS: Performed by: ANESTHESIOLOGIST ASSISTANT

## 2025-04-30 PROCEDURE — 97161 PT EVAL LOW COMPLEX 20 MIN: CPT | Mod: GP | Performed by: PHYSICAL THERAPIST

## 2025-04-30 PROCEDURE — 73560 X-RAY EXAM OF KNEE 1 OR 2: CPT | Mod: LEFT SIDE | Performed by: RADIOLOGY

## 2025-04-30 PROCEDURE — 2500000001 HC RX 250 WO HCPCS SELF ADMINISTERED DRUGS (ALT 637 FOR MEDICARE OP): Performed by: NURSE PRACTITIONER

## 2025-04-30 PROCEDURE — 2500000005 HC RX 250 GENERAL PHARMACY W/O HCPCS: Performed by: STUDENT IN AN ORGANIZED HEALTH CARE EDUCATION/TRAINING PROGRAM

## 2025-04-30 PROCEDURE — A27447 PR TOTAL KNEE ARTHROPLASTY: Performed by: ANESTHESIOLOGIST ASSISTANT

## 2025-04-30 PROCEDURE — 7100000011 HC EXTENDED STAY RECOVERY HOURLY - NURSING UNIT

## 2025-04-30 PROCEDURE — 2720000007 HC OR 272 NO HCPCS: Performed by: ORTHOPAEDIC SURGERY

## 2025-04-30 PROCEDURE — 3600000005 HC OR TIME - INITIAL BASE CHARGE - PROCEDURE LEVEL FIVE: Performed by: ORTHOPAEDIC SURGERY

## 2025-04-30 PROCEDURE — 73560 X-RAY EXAM OF KNEE 1 OR 2: CPT | Mod: LT

## 2025-04-30 PROCEDURE — C1776 JOINT DEVICE (IMPLANTABLE): HCPCS | Performed by: ORTHOPAEDIC SURGERY

## 2025-04-30 PROCEDURE — 2780000003 HC OR 278 NO HCPCS: Performed by: ORTHOPAEDIC SURGERY

## 2025-04-30 DEVICE — ASYMMETRIC PATELLA
Type: IMPLANTABLE DEVICE | Site: KNEE | Status: FUNCTIONAL
Brand: TRIATHLON

## 2025-04-30 DEVICE — CEMENTED STEM
Type: IMPLANTABLE DEVICE | Site: KNEE | Status: FUNCTIONAL
Brand: TRIATHLON

## 2025-04-30 DEVICE — CRUCIATE RETAINING FEMORAL
Type: IMPLANTABLE DEVICE | Site: KNEE | Status: FUNCTIONAL
Brand: TRIATHLON

## 2025-04-30 DEVICE — SIMPLEX® HV IS A FAST-SETTING ACRYLIC RESIN FOR USE IN BONE SURGERY. MIXING THE TWO SEPARATE STERILE COMPONENTS PRODUCES A DUCTILE BONE CEMENT WHICH, AFTER HARDENING, FIXES THE IMPLANT AND TRANSFERS STRESSES PRODUCED DURING MOVEMENT EVENLY TO THE BONE. SIMPLEX® HV CEMENT POWDER ALSO CONTAINS INSOLUBLE ZIRCONIUM DIOXIDE AS AN X-RAY CONTRAST MEDIUM. SIMPLEX® HV DOES NOT EMIT A SIGNAL AND DOES NOT POSE A SAFETY RISK IN A MAGNETIC RESONANCE ENVIRONMENT.
Type: IMPLANTABLE DEVICE | Site: KNEE | Status: FUNCTIONAL
Brand: SIMPLEX HV

## 2025-04-30 DEVICE — UNIVERSAL TIBIAL BASEPLATE
Type: IMPLANTABLE DEVICE | Site: KNEE | Status: FUNCTIONAL
Brand: TRIATHLON

## 2025-04-30 DEVICE — TIBIAL BEARING INSERT - CS
Type: IMPLANTABLE DEVICE | Site: KNEE | Status: FUNCTIONAL
Brand: TRIATHLON

## 2025-04-30 RX ORDER — OXYCODONE AND ACETAMINOPHEN 5; 325 MG/1; MG/1
1 TABLET ORAL EVERY 6 HOURS PRN
Status: DISCONTINUED | OUTPATIENT
Start: 2025-04-30 | End: 2025-04-30

## 2025-04-30 RX ORDER — CEFAZOLIN SODIUM 2 G/100ML
2 INJECTION, SOLUTION INTRAVENOUS EVERY 8 HOURS
Status: COMPLETED | OUTPATIENT
Start: 2025-04-30 | End: 2025-05-01

## 2025-04-30 RX ORDER — OXYCODONE AND ACETAMINOPHEN 5; 325 MG/1; MG/1
1 TABLET ORAL EVERY 4 HOURS PRN
Refills: 0 | Status: DISCONTINUED | OUTPATIENT
Start: 2025-04-30 | End: 2025-05-01

## 2025-04-30 RX ORDER — PHENYLEPHRINE HCL IN 0.9% NACL 1 MG/10 ML
SYRINGE (ML) INTRAVENOUS AS NEEDED
Status: DISCONTINUED | OUTPATIENT
Start: 2025-04-30 | End: 2025-04-30

## 2025-04-30 RX ORDER — DOCUSATE SODIUM 100 MG/1
100 CAPSULE, LIQUID FILLED ORAL 2 TIMES DAILY
Status: DISCONTINUED | OUTPATIENT
Start: 2025-04-30 | End: 2025-05-02 | Stop reason: HOSPADM

## 2025-04-30 RX ORDER — CROMOLYN SODIUM 40 MG/ML
1 SOLUTION/ DROPS OPHTHALMIC 4 TIMES DAILY
Status: DISCONTINUED | OUTPATIENT
Start: 2025-04-30 | End: 2025-05-02 | Stop reason: HOSPADM

## 2025-04-30 RX ORDER — SODIUM CHLORIDE, SODIUM LACTATE, POTASSIUM CHLORIDE, CALCIUM CHLORIDE 600; 310; 30; 20 MG/100ML; MG/100ML; MG/100ML; MG/100ML
100 INJECTION, SOLUTION INTRAVENOUS CONTINUOUS
Status: ACTIVE | OUTPATIENT
Start: 2025-04-30 | End: 2025-04-30

## 2025-04-30 RX ORDER — POLYETHYLENE GLYCOL 3350 17 G/17G
17 POWDER, FOR SOLUTION ORAL DAILY PRN
Status: DISCONTINUED | OUTPATIENT
Start: 2025-04-30 | End: 2025-05-02 | Stop reason: HOSPADM

## 2025-04-30 RX ORDER — POVIDONE-IODINE 5 %
SOLUTION, NON-ORAL OPHTHALMIC (EYE) AS NEEDED
Status: DISCONTINUED | OUTPATIENT
Start: 2025-04-30 | End: 2025-04-30 | Stop reason: HOSPADM

## 2025-04-30 RX ORDER — MELOXICAM 15 MG/1
15 TABLET ORAL DAILY
Status: DISCONTINUED | OUTPATIENT
Start: 2025-04-30 | End: 2025-05-02 | Stop reason: HOSPADM

## 2025-04-30 RX ORDER — MIDAZOLAM HYDROCHLORIDE 1 MG/ML
INJECTION, SOLUTION INTRAMUSCULAR; INTRAVENOUS AS NEEDED
Status: DISCONTINUED | OUTPATIENT
Start: 2025-04-30 | End: 2025-04-30

## 2025-04-30 RX ORDER — FOLIC ACID 1 MG/1
2 TABLET ORAL DAILY
Status: DISCONTINUED | OUTPATIENT
Start: 2025-04-30 | End: 2025-05-02 | Stop reason: HOSPADM

## 2025-04-30 RX ORDER — HYDROMORPHONE HYDROCHLORIDE 1 MG/ML
INJECTION, SOLUTION INTRAMUSCULAR; INTRAVENOUS; SUBCUTANEOUS AS NEEDED
Status: DISCONTINUED | OUTPATIENT
Start: 2025-04-30 | End: 2025-04-30

## 2025-04-30 RX ORDER — WATER 1 ML/ML
INJECTION IRRIGATION AS NEEDED
Status: DISCONTINUED | OUTPATIENT
Start: 2025-04-30 | End: 2025-04-30 | Stop reason: HOSPADM

## 2025-04-30 RX ORDER — ALBUTEROL SULFATE 0.83 MG/ML
2.5 SOLUTION RESPIRATORY (INHALATION) ONCE AS NEEDED
Status: DISCONTINUED | OUTPATIENT
Start: 2025-04-30 | End: 2025-04-30 | Stop reason: HOSPADM

## 2025-04-30 RX ORDER — DIPHENHYDRAMINE HCL 25 MG
25 TABLET ORAL EVERY 8 HOURS PRN
Status: DISCONTINUED | OUTPATIENT
Start: 2025-04-30 | End: 2025-05-02 | Stop reason: HOSPADM

## 2025-04-30 RX ORDER — HYDROMORPHONE HYDROCHLORIDE 0.2 MG/ML
0.2 INJECTION INTRAMUSCULAR; INTRAVENOUS; SUBCUTANEOUS
Status: DISCONTINUED | OUTPATIENT
Start: 2025-04-30 | End: 2025-05-02 | Stop reason: HOSPADM

## 2025-04-30 RX ORDER — FENTANYL CITRATE 50 UG/ML
INJECTION, SOLUTION INTRAMUSCULAR; INTRAVENOUS AS NEEDED
Status: DISCONTINUED | OUTPATIENT
Start: 2025-04-30 | End: 2025-04-30

## 2025-04-30 RX ORDER — CETIRIZINE HYDROCHLORIDE 10 MG/1
10 TABLET ORAL DAILY
Status: DISCONTINUED | OUTPATIENT
Start: 2025-04-30 | End: 2025-05-02 | Stop reason: HOSPADM

## 2025-04-30 RX ORDER — ROCURONIUM BROMIDE 10 MG/ML
INJECTION, SOLUTION INTRAVENOUS AS NEEDED
Status: DISCONTINUED | OUTPATIENT
Start: 2025-04-30 | End: 2025-04-30

## 2025-04-30 RX ORDER — LORAZEPAM 1 MG/1
1 TABLET ORAL EVERY 12 HOURS PRN
Status: DISCONTINUED | OUTPATIENT
Start: 2025-04-30 | End: 2025-05-02 | Stop reason: HOSPADM

## 2025-04-30 RX ORDER — HYDROXYZINE HYDROCHLORIDE 25 MG/1
50 TABLET, FILM COATED ORAL EVERY 8 HOURS PRN
Status: DISCONTINUED | OUTPATIENT
Start: 2025-04-30 | End: 2025-05-02 | Stop reason: HOSPADM

## 2025-04-30 RX ORDER — ACETAMINOPHEN 500 MG
125 TABLET ORAL DAILY
Status: DISCONTINUED | OUTPATIENT
Start: 2025-04-30 | End: 2025-05-02 | Stop reason: HOSPADM

## 2025-04-30 RX ORDER — PROPOFOL 10 MG/ML
INJECTION, EMULSION INTRAVENOUS CONTINUOUS PRN
Status: DISCONTINUED | OUTPATIENT
Start: 2025-04-30 | End: 2025-04-30

## 2025-04-30 RX ORDER — ASPIRIN 81 MG/1
81 TABLET ORAL 2 TIMES DAILY
Status: DISCONTINUED | OUTPATIENT
Start: 2025-04-30 | End: 2025-04-30

## 2025-04-30 RX ORDER — ACETAMINOPHEN 325 MG/1
650 TABLET ORAL EVERY 4 HOURS PRN
Status: DISCONTINUED | OUTPATIENT
Start: 2025-04-30 | End: 2025-04-30 | Stop reason: HOSPADM

## 2025-04-30 RX ORDER — POTASSIUM CHLORIDE 750 MG/1
10 TABLET, FILM COATED, EXTENDED RELEASE ORAL DAILY PRN
Status: DISCONTINUED | OUTPATIENT
Start: 2025-04-30 | End: 2025-05-02 | Stop reason: HOSPADM

## 2025-04-30 RX ORDER — TRANEXAMIC ACID 100 MG/ML
INJECTION, SOLUTION INTRAVENOUS AS NEEDED
Status: DISCONTINUED | OUTPATIENT
Start: 2025-04-30 | End: 2025-04-30

## 2025-04-30 RX ORDER — ROPIVACAINE/EPI/CLONIDINE/KET 2.46-0.005
SYRINGE (ML) INJECTION AS NEEDED
Status: DISCONTINUED | OUTPATIENT
Start: 2025-04-30 | End: 2025-04-30 | Stop reason: HOSPADM

## 2025-04-30 RX ORDER — ONDANSETRON HYDROCHLORIDE 2 MG/ML
4 INJECTION, SOLUTION INTRAVENOUS ONCE AS NEEDED
Status: DISCONTINUED | OUTPATIENT
Start: 2025-04-30 | End: 2025-04-30 | Stop reason: HOSPADM

## 2025-04-30 RX ORDER — LABETALOL HYDROCHLORIDE 5 MG/ML
5 INJECTION, SOLUTION INTRAVENOUS ONCE AS NEEDED
Status: DISCONTINUED | OUTPATIENT
Start: 2025-04-30 | End: 2025-04-30 | Stop reason: HOSPADM

## 2025-04-30 RX ORDER — ACETAMINOPHEN 325 MG/1
650 TABLET ORAL EVERY 6 HOURS SCHEDULED
Status: DISCONTINUED | OUTPATIENT
Start: 2025-04-30 | End: 2025-05-02 | Stop reason: HOSPADM

## 2025-04-30 RX ORDER — ONDANSETRON 4 MG/1
4 TABLET, FILM COATED ORAL EVERY 8 HOURS PRN
Status: DISCONTINUED | OUTPATIENT
Start: 2025-04-30 | End: 2025-05-02 | Stop reason: HOSPADM

## 2025-04-30 RX ORDER — MEPERIDINE HYDROCHLORIDE 50 MG/ML
12.5 INJECTION INTRAMUSCULAR; INTRAVENOUS; SUBCUTANEOUS EVERY 10 MIN PRN
Status: DISCONTINUED | OUTPATIENT
Start: 2025-04-30 | End: 2025-04-30 | Stop reason: HOSPADM

## 2025-04-30 RX ORDER — LORATADINE 10 MG/1
10 TABLET ORAL DAILY
Status: DISCONTINUED | OUTPATIENT
Start: 2025-04-30 | End: 2025-04-30 | Stop reason: ALTCHOICE

## 2025-04-30 RX ORDER — ONDANSETRON HYDROCHLORIDE 2 MG/ML
INJECTION, SOLUTION INTRAVENOUS AS NEEDED
Status: DISCONTINUED | OUTPATIENT
Start: 2025-04-30 | End: 2025-04-30

## 2025-04-30 RX ORDER — ASPIRIN 81 MG/1
81 TABLET ORAL 2 TIMES DAILY
Status: DISCONTINUED | OUTPATIENT
Start: 2025-04-30 | End: 2025-05-01

## 2025-04-30 RX ORDER — AMLODIPINE BESYLATE 10 MG/1
10 TABLET ORAL DAILY
Status: DISCONTINUED | OUTPATIENT
Start: 2025-04-30 | End: 2025-05-02 | Stop reason: HOSPADM

## 2025-04-30 RX ORDER — SODIUM CHLORIDE 0.9 G/100ML
INJECTION, SOLUTION IRRIGATION AS NEEDED
Status: DISCONTINUED | OUTPATIENT
Start: 2025-04-30 | End: 2025-04-30 | Stop reason: HOSPADM

## 2025-04-30 RX ORDER — POLYETHYLENE GLYCOL 3350 17 G/17G
17 POWDER, FOR SOLUTION ORAL DAILY
Status: DISCONTINUED | OUTPATIENT
Start: 2025-04-30 | End: 2025-05-02 | Stop reason: HOSPADM

## 2025-04-30 RX ORDER — MORPHINE SULFATE 2 MG/ML
2 INJECTION, SOLUTION INTRAMUSCULAR; INTRAVENOUS
Status: DISCONTINUED | OUTPATIENT
Start: 2025-04-30 | End: 2025-04-30

## 2025-04-30 RX ORDER — FENTANYL CITRATE 50 UG/ML
25 INJECTION, SOLUTION INTRAMUSCULAR; INTRAVENOUS EVERY 5 MIN PRN
Status: DISCONTINUED | OUTPATIENT
Start: 2025-04-30 | End: 2025-04-30 | Stop reason: HOSPADM

## 2025-04-30 RX ORDER — OXYCODONE AND ACETAMINOPHEN 5; 325 MG/1; MG/1
2 TABLET ORAL EVERY 6 HOURS PRN
Refills: 0 | Status: DISCONTINUED | OUTPATIENT
Start: 2025-04-30 | End: 2025-05-01

## 2025-04-30 RX ORDER — ONDANSETRON HYDROCHLORIDE 2 MG/ML
4 INJECTION, SOLUTION INTRAVENOUS EVERY 8 HOURS PRN
Status: DISCONTINUED | OUTPATIENT
Start: 2025-04-30 | End: 2025-05-02 | Stop reason: HOSPADM

## 2025-04-30 RX ORDER — SODIUM CHLORIDE, SODIUM LACTATE, POTASSIUM CHLORIDE, CALCIUM CHLORIDE 600; 310; 30; 20 MG/100ML; MG/100ML; MG/100ML; MG/100ML
100 INJECTION, SOLUTION INTRAVENOUS CONTINUOUS
Status: ACTIVE | OUTPATIENT
Start: 2025-04-30 | End: 2025-05-01

## 2025-04-30 RX ORDER — DULOXETIN HYDROCHLORIDE 30 MG/1
30 CAPSULE, DELAYED RELEASE ORAL 2 TIMES DAILY
Status: DISCONTINUED | OUTPATIENT
Start: 2025-04-30 | End: 2025-05-02 | Stop reason: HOSPADM

## 2025-04-30 RX ORDER — CYCLOBENZAPRINE HCL 10 MG
10 TABLET ORAL 3 TIMES DAILY PRN
Status: DISCONTINUED | OUTPATIENT
Start: 2025-04-30 | End: 2025-05-02 | Stop reason: HOSPADM

## 2025-04-30 RX ORDER — CEFAZOLIN SODIUM 2 G/100ML
INJECTION, SOLUTION INTRAVENOUS AS NEEDED
Status: DISCONTINUED | OUTPATIENT
Start: 2025-04-30 | End: 2025-04-30

## 2025-04-30 RX ORDER — LIDOCAINE HYDROCHLORIDE 20 MG/ML
INJECTION, SOLUTION INFILTRATION; PERINEURAL AS NEEDED
Status: DISCONTINUED | OUTPATIENT
Start: 2025-04-30 | End: 2025-04-30

## 2025-04-30 RX ADMIN — ROCURONIUM BROMIDE 50 MG: 10 INJECTION INTRAVENOUS at 07:49

## 2025-04-30 RX ADMIN — CEFAZOLIN SODIUM 2 G: 2 INJECTION, SOLUTION INTRAVENOUS at 07:56

## 2025-04-30 RX ADMIN — PROPOFOL 50 MCG/KG/MIN: 10 INJECTION, EMULSION INTRAVENOUS at 07:58

## 2025-04-30 RX ADMIN — DULOXETINE HYDROCHLORIDE 30 MG: 30 CAPSULE, DELAYED RELEASE ORAL at 20:53

## 2025-04-30 RX ADMIN — LORAZEPAM 1 MG: 1 TABLET ORAL at 23:51

## 2025-04-30 RX ADMIN — HYDROMORPHONE HYDROCHLORIDE 0.5 MG: 1 INJECTION, SOLUTION INTRAMUSCULAR; INTRAVENOUS; SUBCUTANEOUS at 08:31

## 2025-04-30 RX ADMIN — PROPOFOL 150 MG: 10 INJECTION, EMULSION INTRAVENOUS at 07:49

## 2025-04-30 RX ADMIN — CEFAZOLIN SODIUM 2 G: 2 INJECTION, SOLUTION INTRAVENOUS at 23:51

## 2025-04-30 RX ADMIN — SODIUM CHLORIDE, POTASSIUM CHLORIDE, SODIUM LACTATE AND CALCIUM CHLORIDE 100 ML/HR: 600; 310; 30; 20 INJECTION, SOLUTION INTRAVENOUS at 15:23

## 2025-04-30 RX ADMIN — OXYCODONE HYDROCHLORIDE AND ACETAMINOPHEN 2 TABLET: 5; 325 TABLET ORAL at 23:51

## 2025-04-30 RX ADMIN — DOCUSATE SODIUM 100 MG: 100 CAPSULE, LIQUID FILLED ORAL at 20:54

## 2025-04-30 RX ADMIN — ONDANSETRON 4 MG: 2 INJECTION, SOLUTION INTRAMUSCULAR; INTRAVENOUS at 09:54

## 2025-04-30 RX ADMIN — SODIUM CHLORIDE, POTASSIUM CHLORIDE, SODIUM LACTATE AND CALCIUM CHLORIDE 100 ML/HR: 600; 310; 30; 20 INJECTION, SOLUTION INTRAVENOUS at 19:03

## 2025-04-30 RX ADMIN — ROCURONIUM BROMIDE 20 MG: 10 INJECTION INTRAVENOUS at 08:27

## 2025-04-30 RX ADMIN — SUGAMMADEX 200 MG: 100 INJECTION, SOLUTION INTRAVENOUS at 10:32

## 2025-04-30 RX ADMIN — FENTANYL CITRATE 100 MCG: 50 INJECTION, SOLUTION INTRAMUSCULAR; INTRAVENOUS at 07:49

## 2025-04-30 RX ADMIN — HYDROMORPHONE HYDROCHLORIDE 0.4 MG: 1 INJECTION, SOLUTION INTRAMUSCULAR; INTRAVENOUS; SUBCUTANEOUS at 11:12

## 2025-04-30 RX ADMIN — SODIUM CHLORIDE, POTASSIUM CHLORIDE, SODIUM LACTATE AND CALCIUM CHLORIDE: 600; 310; 30; 20 INJECTION, SOLUTION INTRAVENOUS at 07:43

## 2025-04-30 RX ADMIN — ROCURONIUM BROMIDE 10 MG: 10 INJECTION INTRAVENOUS at 09:05

## 2025-04-30 RX ADMIN — OXYCODONE HYDROCHLORIDE AND ACETAMINOPHEN 1 TABLET: 5; 325 TABLET ORAL at 15:31

## 2025-04-30 RX ADMIN — Medication 100 MCG: at 08:07

## 2025-04-30 RX ADMIN — OXYCODONE HYDROCHLORIDE AND ACETAMINOPHEN 1 TABLET: 5; 325 TABLET ORAL at 19:02

## 2025-04-30 RX ADMIN — CEFAZOLIN SODIUM 2 G: 2 INJECTION, SOLUTION INTRAVENOUS at 15:23

## 2025-04-30 RX ADMIN — ACETAMINOPHEN 650 MG: 325 TABLET ORAL at 19:02

## 2025-04-30 RX ADMIN — DEXAMETHASONE SODIUM PHOSPHATE 4 MG: 4 INJECTION, SOLUTION INTRAMUSCULAR; INTRAVENOUS at 08:05

## 2025-04-30 RX ADMIN — MIDAZOLAM 2 MG: 1 INJECTION INTRAMUSCULAR; INTRAVENOUS at 07:46

## 2025-04-30 RX ADMIN — FENTANYL CITRATE 25 MCG: 50 INJECTION, SOLUTION INTRAMUSCULAR; INTRAVENOUS at 11:26

## 2025-04-30 RX ADMIN — HYDROMORPHONE HYDROCHLORIDE 0.2 MG: 0.2 INJECTION, SOLUTION INTRAMUSCULAR; INTRAVENOUS; SUBCUTANEOUS at 20:58

## 2025-04-30 RX ADMIN — TRANEXAMIC ACID 1000 MG: 1 INJECTION, SOLUTION INTRAVENOUS at 08:00

## 2025-04-30 RX ADMIN — HYDROMORPHONE HYDROCHLORIDE 0.5 MG: 1 INJECTION, SOLUTION INTRAMUSCULAR; INTRAVENOUS; SUBCUTANEOUS at 09:11

## 2025-04-30 RX ADMIN — TRANEXAMIC ACID 1000 MG: 1 INJECTION, SOLUTION INTRAVENOUS at 09:37

## 2025-04-30 RX ADMIN — FENTANYL CITRATE 25 MCG: 50 INJECTION, SOLUTION INTRAMUSCULAR; INTRAVENOUS at 11:21

## 2025-04-30 RX ADMIN — Medication 2 L/MIN: at 11:15

## 2025-04-30 RX ADMIN — ASPIRIN 81 MG: 81 TABLET, COATED ORAL at 20:53

## 2025-04-30 RX ADMIN — HYDROMORPHONE HYDROCHLORIDE 0.4 MG: 1 INJECTION, SOLUTION INTRAMUSCULAR; INTRAVENOUS; SUBCUTANEOUS at 10:49

## 2025-04-30 RX ADMIN — LIDOCAINE HYDROCHLORIDE 60 MG: 20 INJECTION, SOLUTION INFILTRATION; PERINEURAL at 07:49

## 2025-04-30 SDOH — ECONOMIC STABILITY: FOOD INSECURITY: WITHIN THE PAST 12 MONTHS, YOU WORRIED THAT YOUR FOOD WOULD RUN OUT BEFORE YOU GOT THE MONEY TO BUY MORE.: NEVER TRUE

## 2025-04-30 SDOH — SOCIAL STABILITY: SOCIAL INSECURITY: WITHIN THE LAST YEAR, HAVE YOU BEEN AFRAID OF YOUR PARTNER OR EX-PARTNER?: NO

## 2025-04-30 SDOH — HEALTH STABILITY: MENTAL HEALTH: CURRENT SMOKER: 0

## 2025-04-30 SDOH — ECONOMIC STABILITY: INCOME INSECURITY: IN THE PAST 12 MONTHS HAS THE ELECTRIC, GAS, OIL, OR WATER COMPANY THREATENED TO SHUT OFF SERVICES IN YOUR HOME?: NO

## 2025-04-30 SDOH — SOCIAL STABILITY: SOCIAL INSECURITY: HAVE YOU HAD THOUGHTS OF HARMING ANYONE ELSE?: NO

## 2025-04-30 SDOH — SOCIAL STABILITY: SOCIAL INSECURITY: HAS ANYONE EVER THREATENED TO HURT YOUR FAMILY OR YOUR PETS?: NO

## 2025-04-30 SDOH — ECONOMIC STABILITY: FOOD INSECURITY: WITHIN THE PAST 12 MONTHS, THE FOOD YOU BOUGHT JUST DIDN'T LAST AND YOU DIDN'T HAVE MONEY TO GET MORE.: NEVER TRUE

## 2025-04-30 SDOH — SOCIAL STABILITY: SOCIAL INSECURITY
WITHIN THE LAST YEAR, HAVE YOU BEEN RAPED OR FORCED TO HAVE ANY KIND OF SEXUAL ACTIVITY BY YOUR PARTNER OR EX-PARTNER?: NO

## 2025-04-30 SDOH — SOCIAL STABILITY: SOCIAL INSECURITY: WITHIN THE LAST YEAR, HAVE YOU BEEN HUMILIATED OR EMOTIONALLY ABUSED IN OTHER WAYS BY YOUR PARTNER OR EX-PARTNER?: NO

## 2025-04-30 SDOH — SOCIAL STABILITY: SOCIAL INSECURITY
WITHIN THE LAST YEAR, HAVE YOU BEEN KICKED, HIT, SLAPPED, OR OTHERWISE PHYSICALLY HURT BY YOUR PARTNER OR EX-PARTNER?: NO

## 2025-04-30 SDOH — SOCIAL STABILITY: SOCIAL INSECURITY: HAVE YOU HAD ANY THOUGHTS OF HARMING ANYONE ELSE?: NO

## 2025-04-30 SDOH — SOCIAL STABILITY: SOCIAL INSECURITY: ARE YOU OR HAVE YOU BEEN THREATENED OR ABUSED PHYSICALLY, EMOTIONALLY, OR SEXUALLY BY ANYONE?: NO

## 2025-04-30 SDOH — SOCIAL STABILITY: SOCIAL INSECURITY: DO YOU FEEL UNSAFE GOING BACK TO THE PLACE WHERE YOU ARE LIVING?: NO

## 2025-04-30 SDOH — SOCIAL STABILITY: SOCIAL INSECURITY: DO YOU FEEL ANYONE HAS EXPLOITED OR TAKEN ADVANTAGE OF YOU FINANCIALLY OR OF YOUR PERSONAL PROPERTY?: NO

## 2025-04-30 SDOH — SOCIAL STABILITY: SOCIAL INSECURITY: WERE YOU ABLE TO COMPLETE ALL THE BEHAVIORAL HEALTH SCREENINGS?: YES

## 2025-04-30 SDOH — SOCIAL STABILITY: SOCIAL INSECURITY: ARE THERE ANY APPARENT SIGNS OF INJURIES/BEHAVIORS THAT COULD BE RELATED TO ABUSE/NEGLECT?: NO

## 2025-04-30 SDOH — SOCIAL STABILITY: SOCIAL INSECURITY: DOES ANYONE TRY TO KEEP YOU FROM HAVING/CONTACTING OTHER FRIENDS OR DOING THINGS OUTSIDE YOUR HOME?: NO

## 2025-04-30 SDOH — SOCIAL STABILITY: SOCIAL INSECURITY: ABUSE: ADULT

## 2025-04-30 ASSESSMENT — PAIN - FUNCTIONAL ASSESSMENT
PAIN_FUNCTIONAL_ASSESSMENT: UNABLE TO SELF-REPORT
PAIN_FUNCTIONAL_ASSESSMENT: 0-10

## 2025-04-30 ASSESSMENT — PAIN DESCRIPTION - LOCATION
LOCATION: KNEE

## 2025-04-30 ASSESSMENT — COGNITIVE AND FUNCTIONAL STATUS - GENERAL
STANDING UP FROM CHAIR USING ARMS: A LITTLE
CLIMB 3 TO 5 STEPS WITH RAILING: A LOT
STANDING UP FROM CHAIR USING ARMS: A LITTLE
MOBILITY SCORE: 24
PERSONAL GROOMING: A LITTLE
MOVING FROM LYING ON BACK TO SITTING ON SIDE OF FLAT BED WITH BEDRAILS: A LITTLE
DAILY ACTIVITIY SCORE: 19
MOVING TO AND FROM BED TO CHAIR: A LITTLE
DRESSING REGULAR LOWER BODY CLOTHING: A LITTLE
DRESSING REGULAR UPPER BODY CLOTHING: A LITTLE
WALKING IN HOSPITAL ROOM: A LITTLE
CLIMB 3 TO 5 STEPS WITH RAILING: A LOT
TOILETING: A LITTLE
MOBILITY SCORE: 16
MOBILITY SCORE: 18
TURNING FROM BACK TO SIDE WHILE IN FLAT BAD: A LITTLE
PATIENT BASELINE BEDBOUND: NO
WALKING IN HOSPITAL ROOM: A LOT
DAILY ACTIVITIY SCORE: 24
HELP NEEDED FOR BATHING: A LITTLE
MOVING TO AND FROM BED TO CHAIR: A LITTLE
TURNING FROM BACK TO SIDE WHILE IN FLAT BAD: A LITTLE

## 2025-04-30 ASSESSMENT — ACTIVITIES OF DAILY LIVING (ADL)
PATIENT'S MEMORY ADEQUATE TO SAFELY COMPLETE DAILY ACTIVITIES?: YES
HEARING - RIGHT EAR: FUNCTIONAL
ADEQUATE_TO_COMPLETE_ADL: YES
FEEDING YOURSELF: INDEPENDENT
HEARING - LEFT EAR: FUNCTIONAL
TOILETING: INDEPENDENT
BATHING: INDEPENDENT
WALKS IN HOME: INDEPENDENT
DRESSING YOURSELF: INDEPENDENT
BATHING_ASSISTANCE: MINIMAL
JUDGMENT_ADEQUATE_SAFELY_COMPLETE_DAILY_ACTIVITIES: YES
GROOMING: INDEPENDENT
ADL_ASSISTANCE: NEEDS ASSISTANCE
LACK_OF_TRANSPORTATION: NO

## 2025-04-30 ASSESSMENT — PAIN SCALES - GENERAL
PAINLEVEL_OUTOF10: 10 - WORST POSSIBLE PAIN
PAINLEVEL_OUTOF10: 6
PAINLEVEL_OUTOF10: 10 - WORST POSSIBLE PAIN
PAINLEVEL_OUTOF10: 3
PAINLEVEL_OUTOF10: 10 - WORST POSSIBLE PAIN
PAINLEVEL_OUTOF10: 4
PAINLEVEL_OUTOF10: 10 - WORST POSSIBLE PAIN
PAINLEVEL_OUTOF10: 5 - MODERATE PAIN
PAINLEVEL_OUTOF10: 10 - WORST POSSIBLE PAIN
PAINLEVEL_OUTOF10: 10 - WORST POSSIBLE PAIN
PAINLEVEL_OUTOF10: 5 - MODERATE PAIN
PAINLEVEL_OUTOF10: 10 - WORST POSSIBLE PAIN
PAINLEVEL_OUTOF10: 10 - WORST POSSIBLE PAIN
PAINLEVEL_OUTOF10: 0 - NO PAIN
PAINLEVEL_OUTOF10: 10 - WORST POSSIBLE PAIN

## 2025-04-30 ASSESSMENT — LIFESTYLE VARIABLES
HOW OFTEN DO YOU HAVE A DRINK CONTAINING ALCOHOL: NEVER
SKIP TO QUESTIONS 9-10: 1
AUDIT-C TOTAL SCORE: 0
AUDIT-C TOTAL SCORE: 0
HOW OFTEN DO YOU HAVE 6 OR MORE DRINKS ON ONE OCCASION: NEVER
HOW MANY STANDARD DRINKS CONTAINING ALCOHOL DO YOU HAVE ON A TYPICAL DAY: PATIENT DOES NOT DRINK

## 2025-04-30 ASSESSMENT — PATIENT HEALTH QUESTIONNAIRE - PHQ9
SUM OF ALL RESPONSES TO PHQ9 QUESTIONS 1 & 2: 0
2. FEELING DOWN, DEPRESSED OR HOPELESS: NOT AT ALL
1. LITTLE INTEREST OR PLEASURE IN DOING THINGS: NOT AT ALL

## 2025-04-30 ASSESSMENT — COLUMBIA-SUICIDE SEVERITY RATING SCALE - C-SSRS
2. HAVE YOU ACTUALLY HAD ANY THOUGHTS OF KILLING YOURSELF?: NO
6. HAVE YOU EVER DONE ANYTHING, STARTED TO DO ANYTHING, OR PREPARED TO DO ANYTHING TO END YOUR LIFE?: NO
1. IN THE PAST MONTH, HAVE YOU WISHED YOU WERE DEAD OR WISHED YOU COULD GO TO SLEEP AND NOT WAKE UP?: NO

## 2025-04-30 ASSESSMENT — PAIN DESCRIPTION - DESCRIPTORS
DESCRIPTORS: ACHING
DESCRIPTORS: HEAVINESS;ACHING
DESCRIPTORS: ACHING
DESCRIPTORS: ACHING

## 2025-04-30 ASSESSMENT — PAIN DESCRIPTION - ORIENTATION
ORIENTATION: LEFT

## 2025-04-30 NOTE — ANESTHESIA PROCEDURE NOTES
Airway  Date/Time: 4/30/2025 7:53 AM  Reason: elective    Airway not difficult    Staffing  Performed: LORY   Authorized by: Alberto Alfaro DO    Performed by: LORY Jarvis  Patient location during procedure: OR    Patient Condition  Indications for airway management: anesthesia  Patient position: sniffing  Sedation level: deep     Final Airway Details   Preoxygenated: yes  Final airway type: endotracheal airway  Successful airway: ETT  Cuffed: yes   Successful intubation technique: video laryngoscopy  Adjuncts used in placement: intubating stylet  Endotracheal tube insertion site: oral  Blade: Anamaria  Blade size: #3  ETT size (mm): 7.0  Cormack-Lehane Classification: grade I - full view of glottis  Placement verified by: chest auscultation and capnometry   Measured from: lips  ETT to lips (cm): 21  Number of attempts at approach: 1  Number of other approaches attempted: 0

## 2025-04-30 NOTE — ANESTHESIA PROCEDURE NOTES
Peripheral Block    Patient location during procedure: post-op  Medication administered at: 4/30/2025 10:22 AM  End time: 4/30/2025 10:25 AM  Reason for block: at surgeon's request and post-op pain management  Staffing  Performed: attending   Authorized by: Alberto Alfaro DO    Performed by: Alberto Alfaro DO  Preanesthetic Checklist  Completed: patient identified, IV checked, risks and benefits discussed, monitors and equipment checked, pre-op evaluation and timeout performed   Timeout performed at: 4/30/2025 10:20 AM  Peripheral Block  Patient position: laying flat  Prep: ChloraPrep  Patient monitoring: heart rate, cardiac monitor and continuous pulse ox  Block type: adductor canal  Injection technique: single-shot  Guidance: ultrasound guided  Local infiltration: lidocaine  Infiltration strength: 2 %  Dose: 5 mL  Needle  Needle gauge: 21 G  Needle length: 8 cm  Needle localization: ultrasound guidance  Assessment  Injection assessment: negative aspiration for heme, no paresthesia on injection, incremental injection and local visualized surrounding nerve on ultrasound  Heart rate change: no  Slow fractionated injection: yes  Additional Notes  Patient was placed in supine position and skin prep was applied and allowed to dry over the noted laterality above. A high frequency linear ultrasound probe was placed over the anterior/medial thigh with corresponding anatomical and vascular structures noted. 5 ml of 2% lidocaine was used for topicalization. An echogenic needle was placed with in-line visualization via ultrasound and 30 ml of 0.5% ropivacaine was injected in the adductor canal. All local anesthestic was administered in incremental doses with negative aspiration noted between. Vital signs remained stable and no paresthesias were noted throughout the procedure.

## 2025-04-30 NOTE — ANESTHESIA POSTPROCEDURE EVALUATION
Patient: Marylou Cordero    Procedure Summary       Date: 04/30/25 Room / Location: [facility] OR 10 / [facility] OR    Anesthesia Start: 0743 Anesthesia Stop:     Procedure: LEFT KNEE REPLACEMENT (Left: Knee) Diagnosis:       Unilateral primary osteoarthritis, left knee      (Unilateral primary osteoarthritis, left knee [M17.12])    Surgeons: Roseamrie Vcaa MD Responsible Provider: Alberto Alfaro DO    Anesthesia Type: general ASA Status: 3            Anesthesia Type: general    Vitals Value Taken Time   /98 04/30/25 10:39   Temp 36.7 04/30/25 10:39   Pulse 90 04/30/25 10:39   Resp 16 04/30/25 10:39   SpO2 99 04/30/25 10:39       Anesthesia Post Evaluation    Patient location during evaluation: PACU  Patient participation: complete - patient cannot participate  Level of consciousness: sleepy but conscious  Pain management: adequate  Airway patency: patent  Cardiovascular status: acceptable  Respiratory status: acceptable, face mask and spontaneous ventilation  Hydration status: acceptable  Postoperative Nausea and Vomiting: none  Comments: Patient still endorsing 8/10 pain but refusing anymore pain medication        There were no known notable events for this encounter.

## 2025-04-30 NOTE — OP NOTE
LEFT KNEE REPLACEMENT (L) Operative Note     Date: 2025  OR Location: [facility] OR    Name: Marylou Cordero YOB: 1960, Age: 65 y.o., MRN: [id number], Sex: female    Diagnosis  Pre-op Diagnosis      * Unilateral primary osteoarthritis, left knee [M17.12] Post-op Diagnosis     * Unilateral primary osteoarthritis, left knee [M17.12]     Procedures  LEFT KNEE REPLACEMENT  32829 - SC ARTHRP KNE CONDYLE&PLATU MEDIAL&LAT COMPARTMENTS      Surgeons      * Rosemarie Vaca - Primary    Resident/Fellow/Other Assistant:  Surgeons and Role:  * No surgeons found with a matching role *    Staff:   Surgical Assistant:   Surgical Assistant:   Circulator: Corey  Circulator: Jim  Scrub Person: Yadiel  Scrub Person: Jose  Scrub Person: Angela    Anesthesia Staff: Anesthesiologist: Alberto Alfaro DO  C-AA: LORY Jarvis    Procedure Summary  Anesthesia: Anesthesia type not filed in the log.  ASA: ASA status not filed in the log.  Estimated Blood Loss: 150 mL  Intra-op Medications:   Administrations occurring from 0730 to 1040 on 25:   Medication Name Total Dose   ropivacaine-epinephrine-clonidine-ketorolac 2.46-0.005- 0.0008-0.3mg/mL periarticular syringe 50 mL   povidone-iodine 5 % ophthalmic solution 1 Application   sodium chloride 0.9 % irrigation solution 4,000 mL   sterile water irrigation solution 2,000 mL   ceFAZolin (Ancef) IV 2 g in 100 mL dextrose (iso) - premix 2 g   dexAMETHasone (Decadron) injection 4 mg/mL 4 mg   fentaNYL (Sublimaze) injection 50 mcg/mL 100 mcg   HYDROmorphone (Dilaudid) injection 1 mg/mL 1 mg   LR bolus Cannot be calculated   lidocaine (Xylocaine) injection 2 % 60 mg   midazolam (Versed) injection 1 mg/mL 2 mg   ondansetron 2 mg/mL 4 mg   phenylephrine 100 mcg/mL syringe 10 mL (prefilled) 100 mcg   propofol (Diprivan) injection 10 mg/mL 686.25 mg   rocuronium (ZeMuron) 50 mg/5 mL injection 80 mg   tranexamic acid (Cyklokapron) injection 2,000 mg              Anesthesia  Record               Intraprocedure I/O Totals          Intake    Tranexamic Acid 0.00 mL    The total shown is the total volume documented since Anesthesia Start was filed.    Total Intake 0 mL       Output    Urine 500 mL    Total Output 500 mL       Net    Net Volume -500 mL          Specimen: No specimens collected              Drains and/or Catheters:   Urethral Catheter Non-latex 16 Fr. (Active)       Tourniquet Times:     Total Tourniquet Time Documented:  area (laterality) - 44 minutes  area (laterality) - 21 minutes  Total: area (laterality) - 65 minutes      Implants:  Implants       Type Name Action Serial No.      Implant CEMENT, BONE, SIMPLEX HV FULL DOSE  40 GM - [id number] Implanted      Implant CEMENT, BONE, SIMPLEX HV FULL DOSE  40 GM - [id number] Implanted      Joint PATELLA, TRIATHLON, ASYMMETRIC X3, SZ-A29 9MM - SNA - [id number] Implanted NA     Joint FEM COMP, TRIATH CR SZ3 LEFT - SNA - [id number] Implanted NA     Joint Knee Triathlon X3 Tibial Bearing Insert - CS. JORGE LUIS:4 TYP:CS THKNS: 14mm Implanted NA     Joint BASEPLATE, TIBIA 4 TS TRIATH - SNA - [id number] Implanted NA     Screw STEM, TRIATHLON CEMENTED, 12MM X 50MM - SNA - [id number] Implanted NA              Findings: Severe varus deformity with erosion of the proximal tibia medial aspect  Laxity in the medial and lateral collateral ligaments, hyperextension present    Indications: Marylou Cordero is an 65 y.o. female who is having surgery for Unilateral primary osteoarthritis, left knee [M17.12].   Patient had tried conservative measures and failed, she had a severe varus deformity in the knee    The patient was seen in the preoperative area. The risks, benefits, complications, treatment options, non-operative alternatives, expected recovery and outcomes were discussed with the patient. The possibilities of reaction to medication, pulmonary aspiration, injury to surrounding structures, bleeding, recurrent infection, the need for additional  procedures, failure to diagnose a condition, and creating a complication requiring transfusion or operation were discussed with the patient. The patient concurred with the proposed plan, giving informed consent.  The site of surgery was properly noted/marked if necessary per policy. The patient has been actively warmed in preoperative area. Preoperative antibiotics have been ordered and given within 1 hours of incision. Venous thrombosis prophylaxis have been ordered including unilateral sequential compression device    Procedure Details:     INDICATIONS FOR SURGERY:    The patient is a 65-year-old female who presented with increasing  pain and discomfort  her left knee.  The patient had undergone a right total knee replacement approximately 3 years ago, and has done well.  She tried all conservative measures and these had failed for her left knee.  The risks and benefits of a total knee replacement have been  discussed in detail.  The patient is not obese and the risks and benefits  were carefully explained to her.  The risks of anesthesia; risk of injury to the vessel, nerve, or ligament; fractures; postoperative complications such as hematoma  formation, infection, continued pain in the knee, restricted range of  motion, failure of the extensor expansion, DVT, PE, complications  from any medical condition, osteolysis, loosening, failure of the  implants, prolonged period of rehab and therapy, stiffness of the  knee have all been discussed.  Need for blood transfusion was also  explained.  The patient understands this and wishes to go ahead with  the procedure.     OPERATIVE NOTE:    The patient was brought to the operating room and general anesthesia was given by the Anesthetic Services.  The patient declined spinal anesthesia.  The patient was positioned supine.  All bony prominences were well padded.   Left lower extremity  was prepped and draped in the usual sterile fashion.  Preoperative IV  antibiotics in  the form of 2 g of Ancef was given prior to the start  of the procedure.  A tourniquet was applied and elevated at 270 mmHg.   The limb was exsanguinated.  A time-out was called prior to the  procedure.  The patient had a Glaser catheter placed prior to the  procedure.  This will be removed in 24 hours.  DVT prophylaxis will  begin postoperatively in the form of SCD devices and aspirin.  Early  mobilization will be encouraged.  The patient was also given 1 g of  TXA prior to start of the procedure and 1 after the tourniquet was released.    A midline incision had been marked.  An Ioban drape was used.  The  limb was exsanguinated.  Skin and subcutaneous tissue were carefully  cut and retracted.  The patient was   obese and care was  taken to avoid any damage to the soft tissue structures.  An extra  dissection and clearance were required to obtain good visualization  of the total knee.  The medial release was done.  Remnants of the  meniscus both medially and laterally were resected.  A retractor was  placed laterally and collateral ligaments being protected.  The knee  was gently flexed and brought into view.  Knee was repeatedly  irrigated with irrigating solution.  The knee was irrigated with  dilute Betadine solution.  Collateral ligaments were protected.  The  intramedullary drill hole was drilled into the femur.  Distal femur  was resected resecting 8 mm of bone.  The distal femoral cut was carefully checked.  The bone was noted to  be firm.  The femur was now sized and noted to accommodate a size 3.  The size 3 cutting guide was placed in about 3 degrees of external  rotation.  The anterior-posterior cut and the chamfer cuts were made.   The size 3 femoral component gave a good fit.  The trial component  was removed.  The bone was noted to be soft.  Therefore this implant will be cemented in place.  The intramedullary drill hole was plugged with the bone plug.  Attention was now turned towards the tibia.  This  was subluxated  anteriorly.  Extramedullary guide was placed and the proximal tibia  was carefully cut perpendicular to the long axis.  There was significant bone defect noticed on the medial aspect of the tibia, the cut was taken into maintain some sclerotic bone along the medial side.  The alignment was  checked and required small amount of resection on the lateral side.  The 14 mm spacer block gave good stability both in flexion and  extension.  The tibia was now sized and noted to accommodate a size  4.  The component was placed in external rotation.  The proximal  tibia was prepared.  The trial reduction was done with a size 4 tibial component  followed by the 14 mm insert and a size 3 femoral component and  excellent fit was obtained.  Large polyethylene insert was required, to maintain stability both the medial and lateral aspect particularly since there was laxity seen on both ligaments.  Due to the laxity present as well as the high polyethylene thickness that was used, it was decided to go with intramedullary stem for the tibia.  The stem measured approximately 50 mm x 12 mm in thickness.  The drill hole was made and the trial tibial component was also placed and noted to be satisfactory.   The knee was put through its full range  of motion, noted to be stable and entirely satisfactory.  The patella  was carefully everted.  The thickness measured 23 mm decompressed  using a fan-shaped blade.  The subsequent thickness measured about 15  mm.  The patella was placed in position. It Measured size 29. Subsequent final thickness was 23 mm, after the peg holes were made and a trial patella component was placed in position  Tracking of the patella was checked and noted to be satisfactory there was no subluxation noted.  All the trial components were removed.  The patella was  tracking well.  There was no requirement for lateral release.  Knee  was irrigated out completely.  The irrigation consisting of  Betadine  solution was also used.  Tibia subluxated anteriorly.  Proximal tibia  was prepared and the it was kept clean and dry.  It was Pulsavac'd.  Lamina spreaders had been used posteriorly and osteophytes along the  medial side had been removed.  The PCL was protected preparing the  preparation of the tibia.  However, it was noted to be insufficient  and therefore CS insert was decided upon.     Cementing of the components was done beginning with the tibia  followed by the femur and the patella.  The insert was locked into  place.  The knee was brought to full extension until the cement had  set.  Excess cement was carefully removed.  The knee was irrigated  out once again after the cement had dried.  The knee was once again  checked for stability and noted to be satisfactory.  It was put  through its full range of motion.  The quadriceps tendon was repaired  with Ethibond.  .  Subcutaneous layers  were closed with 2-0 Vicryl.  Skin was reapproximated with Stratafix and  Mepilex dressing were applied.  Webril and an Ace wrap were applied.  The patient will make a satisfactory recovery, she will  begin mobilizing full weightbearing on the knee.  will be  periodically evaluated.  Postoperative x-rays will also be taken.    Complex case  This case could be termed as a complex case, due to the increased laxity of the ligaments.  Careful ligament balancing was required, and the use of a tibial stem was also necessary for good fixation of the tibia.  The patient was also obese, hence careful retraction was done of the tendons and ligaments for good visualization.  Good balancing of the knee both in flexion and extension was eventually achieved.      Evidence of Infection: No   Complications:  None; patient tolerated the procedure well.    Disposition: PACU - hemodynamically stable.  Condition: stable         Task Performed by RNFA or Surgical Assistant:  The role of the assistants involved was in prepping of the lower  extremity and positioning the patient.  During the surgical procedure retraction of the soft tissues were was done by then.  Manipulation of the knee was required during placement of the implants.  They were also involved in closure of the tendinous structures subcutaneous layers as well as the skin.  Application of the dressings as well as moving the patient from the operating table to the regular bed.   No qualified Resident was available to assist in this case.           Additional Details:     Attending Attestation: I performed the procedure.    Rosemarie Vaca  Phone Number: [phone]

## 2025-04-30 NOTE — ANESTHESIA PREPROCEDURE EVALUATION
Patient: Marylou Cordero    Procedure Information       Anesthesia Start Date/Time: 25 0743    Procedure: LEFT KNEE REPLACEMENT (Left: Knee)    Location: [facility] OR 10 / [facility]    Surgeons: Rosemarie Vaca MD            Relevant Problems   No relevant active problems       Clinical information reviewed:    Allergies  Meds               NPO Detail:  NPO/Void Status  Date of Last Liquid: 25  Time of Last Liquid: 530  Date of Last Solid: 25  Time of Last Solid:          Physical Exam    Airway  Mallampati: II  TM distance: >3 FB  Mouth openin finger widths     Cardiovascular - normal exam  Rhythm: regular  Rate: normal     Dental - normal exam     Pulmonary - normal exam   Abdominal - normal examAbdomen: soft             Anesthesia Plan    History of general anesthesia?: yes  History of complications of general anesthesia?: no    ASA 3     general and regional     The patient is not a current smoker.  Patient was previously instructed to abstain from smoking on day of procedure.  Patient did not smoke on day of procedure.  Education provided regarding risk of obstructive sleep apnea.  intravenous induction   Postoperative pain plan includes opioids.  Anesthetic plan and risks discussed with patient.  Use of blood products discussed with patient who.    Plan discussed with CAA.

## 2025-04-30 NOTE — PROGRESS NOTES
Physical Therapy    Physical Therapy Evaluation    Patient Name: Marylou Cordero  MRN: [id number]  Today's Date: 4/30/2025   Time Calculation  Start Time: 1737  Stop Time: 1752  Time Calculation (min): 15 min  4127/4127-A    Assessment/Plan   PT Assessment  PT Assessment Results: Decreased strength, Decreased endurance, Impaired balance, Decreased mobility  Rehab Prognosis: Good  Evaluation/Treatment Tolerance: Patient tolerated treatment well  Medical Staff Made Aware: Yes  End of Session Communication: Bedside nurse  Assessment Comment: Pt is a 65 y.o. female admitted for Unilateral primary osteoarthritis, left knee [M17.12]  Total knee replacement status, left [Z96.652] on 4/30/2025. Pt below functional level and will benefit from skilled therapy during stay to improve overall functional mobility, strength, ROM, endurance and safety awareness. Upon discharge pt will benefit from low intensity therapy for continued improvement in functional mobility. Therapy will continue to follow and reassess each session.      End of Session Patient Position: Up in chair  IP OR SWING BED PT PLAN  Inpatient or Swing Bed: Inpatient  PT Plan  Treatment/Interventions: Bed mobility, Transfer training, Gait training, Stair training, Balance training, Strengthening, Therapeutic exercise, Therapeutic activity, Endurance training, Home exercise program  PT Plan: Ongoing PT  PT Frequency: BID  PT Discharge Recommendations: Low intensity level of continued care (pending progress)  Equipment Recommended upon Discharge: Wheeled walker  PT Recommended Transfer Status: Assist x1  PT - OK to Discharge: Yes    Subjective     Current Problem:  No diagnosis found.  Problem List[1]    General Visit Information:  General  Reason for Referral: impaired mobility s/p L TKA  Referred By: Dr. Vaca  Past Medical History Relevant to Rehab: Anemia, Anxiety, Arthritis, Coronary artery disease, Depression, DVT (deep venous thrombosis), GERD  (gastroesophageal reflux disease), Hypertension, Meningioma, Migraines  Pericardial effusion, Pericarditis, Rheumatoid arthritis, SLE (systemic lupus erythematosus), Smoker  Prior to Session Communication: Bedside nurse  Patient Position Received: Bed, 2 rail up  Preferred Learning Style: kinesthetic, verbal  General Comment: Pt agreeable to therapy    Home Living:  Home Living  Type of Home: House  Lives With: Other (Comment)  Home Adaptive Equipment: Walker rolling or standard, Cane  Home Layout: Able to live on main level with bedroom/bathroom, Two level (4 steps downs to main living area with rail)  Home Access: No concerns  Bathroom Shower/Tub: Walk-in shower  Bathroom Equipment: Shower chair with back    Prior Level of Function:  Prior Function Per Pt/Caregiver Report  Level of Angelina: Needs assistance with ADLs  Receives Help From: Family  ADL Assistance: Needs assistance  Bath: Minimal  Homemaking Assistance: Needs assistance  Ambulatory Assistance:  (uses cane)    Precautions:  Precautions  LE Weight Bearing Status: Weight Bearing as Tolerated  Medical Precautions: Fall precautions  Post-Surgical Precautions: Left total knee precautions    Vital Signs:     Objective     Pain:  Pain Assessment  Pain Assessment: 0-10  0-10 (Numeric) Pain Score: 10 - Worst possible pain  Pain Type: Surgical pain  Pain Location: Knee  Pain Orientation: Left    Cognition:  Cognition  Overall Cognitive Status: Within Functional Limits  Orientation Level: Oriented X4    General Assessments:      Activity Tolerance  Endurance: Tolerates 10 - 20 min exercise with multiple rests                 Static Sitting Balance  Static Sitting-Comment/Number of Minutes: good  Dynamic Sitting Balance  Dynamic Sitting-Comments: good  Static Standing Balance  Static Standing-Comment/Number of Minutes: good  Dynamic Standing Balance  Dynamic Standing-Comments: fair +    Functional Assessments:        Transfers  Transfer: Yes  Transfer  1  Transfer From 1: Sit to  Transfer to 1: Stand  Technique 1: Sit to stand, Stand to sit  Transfer Device 1: Gait belt, Walker  Transfer Level of Assistance 1: Contact guard  Ambulation/Gait Training  Ambulation/Gait Training Performed: No (Pt writhing in pain. RN made aware)          Extremity/Trunk Assessments:        RLE   RLE : Within Functional Limits  LLE   LLE : Exceptions to WFL  Strength LLE  L Hip Flexion: 4/5  L Hip Extension: 4/5  L Hip ABduction: 4/5  L Hip ADduction: 4/5  L Ankle Dorsiflexion: 5/5  L Ankle Plantar Flexion: 5/5    Outcome Measures:     [facility] Basic Mobility  Turning from your back to your side while in a flat bed without using bedrails: A little  Moving from lying on your back to sitting on the side of a flat bed without using bedrails: A little  Moving to and from bed to chair (including a wheelchair): A little  Standing up from a chair using your arms (e.g. wheelchair or bedside chair): A little  To walk in hospital room: A lot  Climbing 3-5 steps with railing: A lot  Basic Mobility - Total Score: 16          Goals:  Encounter Problems       Encounter Problems (Active)       PT Problem       Pt will demonstrate mod I for all bed mobility         Start:  04/30/25    Expected End:  05/07/25            Pt will demonstrate mod I for all transfers with WW        Start:  04/30/25    Expected End:  05/07/25            Pt will ambulate 100 ft with WW and mod I.        Start:  04/30/25    Expected End:  05/07/25            Pt will be able to negotiate 4 steps with rail and CGA.        Start:  04/30/25    Expected End:  05/07/25               Pain - Adult            Education Documentation  Handouts, taught by Sierra Enriquez, PT at 4/30/2025  6:51 PM.  Learner: Patient  Readiness: Acceptance  Method: Explanation  Response: Verbalizes Understanding, Needs Reinforcement    Precautions, taught by Sierra Enriquez PT at 4/30/2025  6:51 PM.  Learner: Patient  Readiness: Acceptance  Method:  Explanation  Response: Verbalizes Understanding, Needs Reinforcement    Body Mechanics, taught by Sierra Enriquez, PT at 4/30/2025  6:51 PM.  Learner: Patient  Readiness: Acceptance  Method: Explanation  Response: Verbalizes Understanding, Needs Reinforcement    Home Exercise Program, taught by Sierra Enriquez, PT at 4/30/2025  6:51 PM.  Learner: Patient  Readiness: Acceptance  Method: Explanation  Response: Verbalizes Understanding, Needs Reinforcement    Mobility Training, taught by Sierra Enriquez, PT at 4/30/2025  6:51 PM.  Learner: Patient  Readiness: Acceptance  Method: Explanation  Response: Verbalizes Understanding, Needs Reinforcement    Education Comments  No comments found.             [1]   Patient Active Problem List  Diagnosis    Pericardial effusion ([facility])    Pericarditis ([facility])    Total knee replacement status, left

## 2025-05-01 ENCOUNTER — APPOINTMENT (OUTPATIENT)
Dept: CARDIOLOGY | Facility: HOSPITAL | Age: 65
End: 2025-05-01
Payer: COMMERCIAL

## 2025-05-01 LAB
ANION GAP SERPL CALC-SCNC: 12 MMOL/L (ref 10–20)
BUN SERPL-MCNC: 8 MG/DL (ref 6–23)
CALCIUM SERPL-MCNC: 8.8 MG/DL (ref 8.6–10.3)
CARDIAC TROPONIN I PNL SERPL HS: 4 NG/L (ref 0–13)
CHLORIDE SERPL-SCNC: 102 MMOL/L (ref 98–107)
CO2 SERPL-SCNC: 26 MMOL/L (ref 21–32)
CREAT SERPL-MCNC: 0.48 MG/DL (ref 0.5–1.05)
EGFRCR SERPLBLD CKD-EPI 2021: >90 ML/MIN/1.73M*2
ERYTHROCYTE [DISTWIDTH] IN BLOOD BY AUTOMATED COUNT: 20.9 % (ref 11.5–14.5)
GLUCOSE SERPL-MCNC: 124 MG/DL (ref 74–99)
HCT VFR BLD AUTO: 31 % (ref 36–46)
HGB BLD-MCNC: 9.6 G/DL (ref 12–16)
HOLD SPECIMEN: 293
MAGNESIUM SERPL-MCNC: 1.78 MG/DL (ref 1.6–2.4)
MCH RBC QN AUTO: 23.3 PG (ref 26–34)
MCHC RBC AUTO-ENTMCNC: 31 G/DL (ref 32–36)
MCV RBC AUTO: 75 FL (ref 80–100)
NRBC BLD-RTO: 0 /100 WBCS (ref 0–0)
PLATELET # BLD AUTO: 213 X10*3/UL (ref 150–450)
POTASSIUM SERPL-SCNC: 3 MMOL/L (ref 3.5–5.3)
RBC # BLD AUTO: 4.12 X10*6/UL (ref 4–5.2)
SODIUM SERPL-SCNC: 137 MMOL/L (ref 136–145)
WBC # BLD AUTO: 8.6 X10*3/UL (ref 4.4–11.3)

## 2025-05-01 PROCEDURE — 80048 BASIC METABOLIC PNL TOTAL CA: CPT | Performed by: ORTHOPAEDIC SURGERY

## 2025-05-01 PROCEDURE — 2500000001 HC RX 250 WO HCPCS SELF ADMINISTERED DRUGS (ALT 637 FOR MEDICARE OP): Performed by: NURSE PRACTITIONER

## 2025-05-01 PROCEDURE — 83735 ASSAY OF MAGNESIUM: CPT | Performed by: PHYSICIAN ASSISTANT

## 2025-05-01 PROCEDURE — 36415 COLL VENOUS BLD VENIPUNCTURE: CPT | Performed by: ORTHOPAEDIC SURGERY

## 2025-05-01 PROCEDURE — 85027 COMPLETE CBC AUTOMATED: CPT | Performed by: ORTHOPAEDIC SURGERY

## 2025-05-01 PROCEDURE — 2500000001 HC RX 250 WO HCPCS SELF ADMINISTERED DRUGS (ALT 637 FOR MEDICARE OP): Performed by: PHYSICIAN ASSISTANT

## 2025-05-01 PROCEDURE — 84484 ASSAY OF TROPONIN QUANT: CPT | Performed by: PHYSICIAN ASSISTANT

## 2025-05-01 PROCEDURE — 2500000004 HC RX 250 GENERAL PHARMACY W/ HCPCS (ALT 636 FOR OP/ED): Performed by: ORTHOPAEDIC SURGERY

## 2025-05-01 PROCEDURE — 2500000002 HC RX 250 W HCPCS SELF ADMINISTERED DRUGS (ALT 637 FOR MEDICARE OP, ALT 636 FOR OP/ED): Performed by: PHYSICIAN ASSISTANT

## 2025-05-01 PROCEDURE — 2500000004 HC RX 250 GENERAL PHARMACY W/ HCPCS (ALT 636 FOR OP/ED): Mod: JZ | Performed by: PHYSICIAN ASSISTANT

## 2025-05-01 PROCEDURE — 82374 ASSAY BLOOD CARBON DIOXIDE: CPT | Performed by: ORTHOPAEDIC SURGERY

## 2025-05-01 PROCEDURE — 97530 THERAPEUTIC ACTIVITIES: CPT | Mod: GP,CQ

## 2025-05-01 PROCEDURE — 93005 ELECTROCARDIOGRAM TRACING: CPT

## 2025-05-01 PROCEDURE — 7100000011 HC EXTENDED STAY RECOVERY HOURLY - NURSING UNIT

## 2025-05-01 PROCEDURE — 36415 COLL VENOUS BLD VENIPUNCTURE: CPT | Performed by: PHYSICIAN ASSISTANT

## 2025-05-01 PROCEDURE — 93010 ELECTROCARDIOGRAM REPORT: CPT | Performed by: INTERNAL MEDICINE

## 2025-05-01 PROCEDURE — RXMED WILLOW AMBULATORY MEDICATION CHARGE

## 2025-05-01 PROCEDURE — 2500000001 HC RX 250 WO HCPCS SELF ADMINISTERED DRUGS (ALT 637 FOR MEDICARE OP): Performed by: ORTHOPAEDIC SURGERY

## 2025-05-01 PROCEDURE — 97165 OT EVAL LOW COMPLEX 30 MIN: CPT | Mod: GO | Performed by: OCCUPATIONAL THERAPIST

## 2025-05-01 PROCEDURE — 96372 THER/PROPH/DIAG INJ SC/IM: CPT | Performed by: PHYSICIAN ASSISTANT

## 2025-05-01 PROCEDURE — 97116 GAIT TRAINING THERAPY: CPT | Mod: GP,CQ

## 2025-05-01 RX ORDER — LANOLIN ALCOHOL/MO/W.PET/CERES
400 CREAM (GRAM) TOPICAL DAILY
Status: DISCONTINUED | OUTPATIENT
Start: 2025-05-01 | End: 2025-05-02 | Stop reason: HOSPADM

## 2025-05-01 RX ORDER — DOCUSATE SODIUM 100 MG/1
100 CAPSULE, LIQUID FILLED ORAL 2 TIMES DAILY
Qty: 20 CAPSULE | Refills: 0 | Status: SHIPPED | OUTPATIENT
Start: 2025-05-01 | End: 2025-05-12

## 2025-05-01 RX ORDER — ACETAMINOPHEN 325 MG/1
650 TABLET ORAL EVERY 6 HOURS SCHEDULED
Qty: 56 TABLET | Refills: 0 | Status: SHIPPED | OUTPATIENT
Start: 2025-05-01 | End: 2025-05-09

## 2025-05-01 RX ORDER — OXYCODONE HYDROCHLORIDE 5 MG/1
5 TABLET ORAL EVERY 6 HOURS PRN
Qty: 28 TABLET | Refills: 0 | Status: SHIPPED | OUTPATIENT
Start: 2025-05-01 | End: 2025-05-02 | Stop reason: HOSPADM

## 2025-05-01 RX ORDER — ENOXAPARIN SODIUM 100 MG/ML
40 INJECTION SUBCUTANEOUS DAILY
Status: DISCONTINUED | OUTPATIENT
Start: 2025-05-01 | End: 2025-05-02 | Stop reason: HOSPADM

## 2025-05-01 RX ORDER — OXYCODONE HYDROCHLORIDE 10 MG/1
10 TABLET ORAL EVERY 4 HOURS PRN
Refills: 0 | Status: DISCONTINUED | OUTPATIENT
Start: 2025-05-01 | End: 2025-05-02 | Stop reason: HOSPADM

## 2025-05-01 RX ORDER — OXYCODONE HYDROCHLORIDE 5 MG/1
5 TABLET ORAL EVERY 4 HOURS PRN
Refills: 0 | Status: DISCONTINUED | OUTPATIENT
Start: 2025-05-01 | End: 2025-05-02 | Stop reason: HOSPADM

## 2025-05-01 RX ORDER — POTASSIUM CHLORIDE 20 MEQ/1
40 TABLET, EXTENDED RELEASE ORAL ONCE
Status: COMPLETED | OUTPATIENT
Start: 2025-05-01 | End: 2025-05-01

## 2025-05-01 RX ORDER — ONDANSETRON 4 MG/1
4 TABLET, FILM COATED ORAL EVERY 8 HOURS PRN
Qty: 10 TABLET | Refills: 0 | Status: SHIPPED | OUTPATIENT
Start: 2025-05-01

## 2025-05-01 RX ORDER — CYCLOBENZAPRINE HCL 10 MG
10 TABLET ORAL 3 TIMES DAILY PRN
Qty: 10 TABLET | Refills: 0 | Status: SHIPPED | OUTPATIENT
Start: 2025-05-01 | End: 2025-05-06

## 2025-05-01 RX ORDER — KETOROLAC TROMETHAMINE 15 MG/ML
15 INJECTION, SOLUTION INTRAMUSCULAR; INTRAVENOUS EVERY 6 HOURS
Status: DISCONTINUED | OUTPATIENT
Start: 2025-05-01 | End: 2025-05-02 | Stop reason: HOSPADM

## 2025-05-01 RX ADMIN — OXYCODONE HYDROCHLORIDE 10 MG: 10 TABLET ORAL at 10:24

## 2025-05-01 RX ADMIN — POTASSIUM CHLORIDE 40 MEQ: 1500 TABLET, EXTENDED RELEASE ORAL at 09:55

## 2025-05-01 RX ADMIN — DULOXETINE HYDROCHLORIDE 30 MG: 30 CAPSULE, DELAYED RELEASE ORAL at 22:27

## 2025-05-01 RX ADMIN — DOCUSATE SODIUM 100 MG: 100 CAPSULE, LIQUID FILLED ORAL at 22:27

## 2025-05-01 RX ADMIN — CYCLOBENZAPRINE 10 MG: 10 TABLET, FILM COATED ORAL at 05:38

## 2025-05-01 RX ADMIN — CETIRIZINE HYDROCHLORIDE 10 MG: 10 TABLET, FILM COATED ORAL at 09:54

## 2025-05-01 RX ADMIN — FOLIC ACID 2 MG: 1 TABLET ORAL at 09:53

## 2025-05-01 RX ADMIN — OXYCODONE HYDROCHLORIDE AND ACETAMINOPHEN 2 TABLET: 5; 325 TABLET ORAL at 05:38

## 2025-05-01 RX ADMIN — ENOXAPARIN SODIUM 40 MG: 40 INJECTION SUBCUTANEOUS at 10:01

## 2025-05-01 RX ADMIN — ONDANSETRON HYDROCHLORIDE 4 MG: 4 TABLET, FILM COATED ORAL at 09:54

## 2025-05-01 RX ADMIN — POLYETHYLENE GLYCOL 3350 17 G: 17 POWDER, FOR SOLUTION ORAL at 09:57

## 2025-05-01 RX ADMIN — KETOROLAC TROMETHAMINE 15 MG: 15 INJECTION, SOLUTION INTRAMUSCULAR; INTRAVENOUS at 22:28

## 2025-05-01 RX ADMIN — Medication 400 MG: at 12:11

## 2025-05-01 RX ADMIN — CHOLECALCIFEROL TAB 125 MCG (5000 UNIT) 125 MCG: 125 TAB at 09:55

## 2025-05-01 RX ADMIN — AMLODIPINE BESYLATE 10 MG: 10 TABLET ORAL at 09:54

## 2025-05-01 RX ADMIN — DULOXETINE HYDROCHLORIDE 30 MG: 30 CAPSULE, DELAYED RELEASE ORAL at 09:53

## 2025-05-01 RX ADMIN — DOCUSATE SODIUM 100 MG: 100 CAPSULE, LIQUID FILLED ORAL at 09:54

## 2025-05-01 RX ADMIN — KETOROLAC TROMETHAMINE 15 MG: 15 INJECTION, SOLUTION INTRAMUSCULAR; INTRAVENOUS at 16:11

## 2025-05-01 ASSESSMENT — PAIN SCALES - GENERAL
PAINLEVEL_OUTOF10: 10 - WORST POSSIBLE PAIN
PAINLEVEL_OUTOF10: 6
PAINLEVEL_OUTOF10: 10 - WORST POSSIBLE PAIN
PAINLEVEL_OUTOF10: 8
PAINLEVEL_OUTOF10: 3
PAINLEVEL_OUTOF10: 10 - WORST POSSIBLE PAIN
PAINLEVEL_OUTOF10: 10 - WORST POSSIBLE PAIN
PAINLEVEL_OUTOF10: 5 - MODERATE PAIN
PAINLEVEL_OUTOF10: 7
PAINLEVEL_OUTOF10: 8

## 2025-05-01 ASSESSMENT — COGNITIVE AND FUNCTIONAL STATUS - GENERAL
MOVING TO AND FROM BED TO CHAIR: A LITTLE
TOILETING: A LITTLE
DRESSING REGULAR LOWER BODY CLOTHING: A LITTLE
HELP NEEDED FOR BATHING: A LITTLE
MOBILITY SCORE: 17
WALKING IN HOSPITAL ROOM: A LITTLE
WALKING IN HOSPITAL ROOM: A LITTLE
CLIMB 3 TO 5 STEPS WITH RAILING: A LOT
MOVING TO AND FROM BED TO CHAIR: A LITTLE
STANDING UP FROM CHAIR USING ARMS: A LITTLE
DAILY ACTIVITIY SCORE: 19
TURNING FROM BACK TO SIDE WHILE IN FLAT BAD: A LITTLE
MOVING FROM LYING ON BACK TO SITTING ON SIDE OF FLAT BED WITH BEDRAILS: A LITTLE
PERSONAL GROOMING: A LITTLE
MOVING FROM LYING ON BACK TO SITTING ON SIDE OF FLAT BED WITH BEDRAILS: A LITTLE
STANDING UP FROM CHAIR USING ARMS: A LITTLE
TURNING FROM BACK TO SIDE WHILE IN FLAT BAD: A LITTLE
MOBILITY SCORE: 17
CLIMB 3 TO 5 STEPS WITH RAILING: A LOT
DRESSING REGULAR UPPER BODY CLOTHING: A LITTLE

## 2025-05-01 ASSESSMENT — PAIN - FUNCTIONAL ASSESSMENT
PAIN_FUNCTIONAL_ASSESSMENT: 0-10

## 2025-05-01 ASSESSMENT — PAIN DESCRIPTION - LOCATION
LOCATION: KNEE

## 2025-05-01 ASSESSMENT — ACTIVITIES OF DAILY LIVING (ADL)
ADL_ASSISTANCE: NEEDS ASSISTANCE
BATHING_ASSISTANCE: MINIMAL

## 2025-05-01 ASSESSMENT — PAIN DESCRIPTION - ORIENTATION
ORIENTATION: LEFT
ORIENTATION: LEFT

## 2025-05-01 ASSESSMENT — PAIN DESCRIPTION - DESCRIPTORS
DESCRIPTORS: THROBBING
DESCRIPTORS: PRESSURE

## 2025-05-01 NOTE — PROGRESS NOTES
"Marylou Cordero is a 65 y.o. female presenting with Total knee replacement status, left.    Subjective   Ms. Cordero complains of moderate to severe left knee pain. She also complains of occasional left chest discomfort with deep inspiration, similar to that she has had in the past. She denies shortness of breath.  She complains also of mild abdominal pain, chiefly central abdomen/epigastric region. She has not eaten this morning, but has been taking medication, including pain meds.  She is passing flatus.       Objective     Physical Exam  Vitals reviewed.   HENT:      Head: Normocephalic.      Mouth/Throat:      Mouth: Mucous membranes are moist.      Pharynx: Oropharynx is clear.   Eyes:      Extraocular Movements: Extraocular movements intact.   Cardiovascular:      Rate and Rhythm: Normal rate and regular rhythm.      Heart sounds: No murmur heard.  Pulmonary:      Effort: Pulmonary effort is normal.      Breath sounds: Rales present.      Comments: Few crackles right lower lobe; chest pain reproducible on palpation of left superior lateral chest.   Abdominal:      Palpations: Abdomen is soft.      Comments: Abdomen is softly rounded, with bowel sounds X 4 quads, mild pain on palpation-chiefly central abdomen, no rebound pain or guarding.    Musculoskeletal:      Comments: Left LE: Left knee dressing is dry and intact; notes some ecchymosis about incision; light touch sensation are intact, motor function intact to ankle dorsiflexion/plantarflexion EHL.  DP palpable 2+/2   Skin:     General: Skin is warm and dry.      Capillary Refill: Capillary refill takes less than 2 seconds.   Neurological:      General: No focal deficit present.      Mental Status: She is alert. Mental status is at baseline.   Psychiatric:         Mood and Affect: Mood normal.         Last Recorded Vitals  Blood pressure 137/87, pulse 105, temperature 36.7 °C (98.1 °F), temperature source Temporal, resp. rate 19, height 1.626 m (5' 4\"), " weight 97.5 kg (215 lb), SpO2 93%.  Intake/Output last 3 Shifts:  I/O last 3 completed shifts:  In: 2710 (27.8 mL/kg) [P.O.:175; I.V.:1435 (14.7 mL/kg); IV Piggyback:1100]  Out: 2600 (26.7 mL/kg) [Urine:2600 (0.7 mL/kg/hr)]  Weight: 97.5 kg     Relevant Results      Scheduled medications  Scheduled Medications[1]  Continuous medications  Continuous Medications[2]  PRN medications  PRN Medications[3]  Results for orders placed or performed during the hospital encounter of 04/30/25 (from the past 24 hours)   CBC   Result Value Ref Range    WBC 8.6 4.4 - 11.3 x10*3/uL    nRBC 0.0 0.0 - 0.0 /100 WBCs    RBC 4.12 4.00 - 5.20 x10*6/uL    Hemoglobin 9.6 (L) 12.0 - 16.0 g/dL    Hematocrit 31.0 (L) 36.0 - 46.0 %    MCV 75 (L) 80 - 100 fL    MCH 23.3 (L) 26.0 - 34.0 pg    MCHC 31.0 (L) 32.0 - 36.0 g/dL    RDW 20.9 (H) 11.5 - 14.5 %    Platelets 213 150 - 450 x10*3/uL   Basic metabolic panel   Result Value Ref Range    Glucose 124 (H) 74 - 99 mg/dL    Sodium 137 136 - 145 mmol/L    Potassium 3.0 (L) 3.5 - 5.3 mmol/L    Chloride 102 98 - 107 mmol/L    Bicarbonate 26 21 - 32 mmol/L    Anion Gap 12 10 - 20 mmol/L    Urea Nitrogen 8 6 - 23 mg/dL    Creatinine 0.48 (L) 0.50 - 1.05 mg/dL    eGFR >90 >60 mL/min/1.73m*2    Calcium 8.8 8.6 - 10.3 mg/dL   Magnesium   Result Value Ref Range    Magnesium 1.78 1.60 - 2.40 mg/dL   Troponin I, High Sensitivity   Result Value Ref Range    Troponin I, High Sensitivity 4 0 - 13 ng/L   PST Top   Result Value Ref Range    Extra Tube 293                XR knee left 1-2 views  Result Date: 4/30/2025  Interpreted By:  Israel Bennett, STUDY: XR KNEE LEFT 1-2 VIEWS;  4/30/2025 11:05 am   INDICATION: Signs/Symptoms:Post-op knee.   COMPARISON: None.   ACCESSION NUMBER(S): [id number]   ORDERING CLINICIAN: NAKUL ROSE   FINDINGS: Left knee arthroplasty components in anatomic alignment.       New left knee arthroplasty in anatomic alignment.   MACRO: None   Signed by: Israel Bennett 4/30/2025 11:16  AM Dictation workstation:   [id number]                 Assessment & Plan  Total knee replacement status, left  Total knee replacement status, left  POD #1 s/p left TKA  Continue PT/OT, WBAT left  LE  Reviewed am labs  Multimodal pain regimen  knee dressing to be removed on post op day #7  VTE prophylaxis: lovenox 40mg daily X 30 days or oral equivalent upon discharge   Will discharge home when appropriate with [facility]  Follow up with Dr. Gilmore as directed    2. Intermittent Chest discomfort  Occasionally occurs with deep inspiration, has had intermittently in past   Reproducible chest discomfort  EKG, no significant change since 12/24 EKG  High sensitivity trop: 4    3. Abdominal discomfort  Abdomen is soft, is passing flatus  Likely related to taking medication without eating; now agreeable to eating with medications  Given zofran   Check bladder scan to r/o urinary retention  Will reassess and monitor closely    On recheck, Ms. Cordero's chest and abdominal discomfort have resolved. She does, however, complain of severe left knee pain despite oxycodone 10mg. She is agreeable to toradol IV.     I spent 25 minutes in the professional and overall care of this patient.      Nettie Mariano PA-C           [1] acetaminophen, 650 mg, oral, q6h IVY  amLODIPine, 10 mg, oral, Daily  [Held by provider] aspirin, 81 mg, oral, BID  cetirizine, 10 mg, oral, Daily  cholecalciferol, 125 mcg, oral, Daily  cromolyn, 1 drop, Both Eyes, 4x daily  docusate sodium, 100 mg, oral, BID  DULoxetine, 30 mg, oral, BID  enoxaparin, 40 mg, subcutaneous, Daily  folic acid, 2 mg, oral, Daily  magnesium oxide, 400 mg, oral, Daily  meloxicam, 15 mg, oral, Daily  polyethylene glycol, 17 g, oral, Daily    [2] lactated Ringer's, 100 mL/hr, Last Rate: Stopped (05/01/25 0544)  oxygen, 2 L/min, Last Rate: Stopped (04/30/25 6639)    [3] PRN medications: cyclobenzaprine, diphenhydrAMINE, HYDROmorphone, hydrOXYzine HCL, LORazepam, lubricating eye drops,  ondansetron **OR** ondansetron, oxyCODONE, oxyCODONE, polyethylene glycol, potassium chloride CR

## 2025-05-01 NOTE — PROGRESS NOTES
Occupational Therapy                 Therapy Communication Note    Patient Name: Marylou Cordero  MRN: [id number]  Department: [facility] 4 OBS  Room: [facility]4127-A  Today's Date: 5/1/2025     Discipline: Occupational Therapy    Missed Visit Reason:  Chart review completed. Attempted OT evaluation at 0935.  Nursing staff in room performing EKG. Will complete OT evaluation as appropriate.    Missed Time: Attempt

## 2025-05-01 NOTE — PROGRESS NOTES
05/01/25 0959   Discharge Planning   Living Arrangements Children  (2 daughters and a son also live with her)   Expected Discharge Disposition Home H   Does the patient need discharge transport arranged? No   Stroke Family Assessment   Stroke Family Assessment Needed No   Intensity of Service   Intensity of Service 0-30 min     Spoke with patient and she will be returning home with her daughters and son. Her  lives in the same household but does not assist her in any way. Instructed me to call her daughters. Called Marielena and left a message to return my call.   1200 Return call from daughter and son.  Plan is for her to return home with their assistance. [facility] is scheduled at 9am tomorrow.  Patient has a walker, cane and grab bars.

## 2025-05-01 NOTE — PROGRESS NOTES
Physical Therapy    Physical Therapy    Physical Therapy Treatment    Patient Name: Marylou Cordero  MRN: [id number]  Today's Date: 5/1/2025  Time Calculation  Start Time: 0820  Stop Time: 0846  Time Calculation (min): 26 min     4127/4127-A       05/01/25 0820   PT  Visit   PT Received On 05/01/25   Response to Previous Treatment Patient with no complaints from previous session.   General   Reason for Referral impaired mobility s/p L TKA   Referred By Dr. Vaca   Past Medical History Relevant to Rehab Anemia, Anxiety, Arthritis, Coronary artery disease, Depression, DVT (deep venous thrombosis), GERD (gastroesophageal reflux disease), Hypertension, Meningioma, Migraines  Pericardial effusion, Pericarditis, Rheumatoid arthritis, SLE (systemic lupus erythematosus), Smoker   Prior to Session Communication Bedside nurse   Patient Position Received Bed, 2 rail up;Alarm on   Preferred Learning Style kinesthetic;verbal   General Comment Pt agreeable to therapy, cleared for participation   Precautions   LE Weight Bearing Status Weight Bearing as Tolerated   Medical Precautions Fall precautions   Post-Surgical Precautions Left total knee precautions   Pain Assessment   Pain Assessment 0-10   0-10 (Numeric) Pain Score 8   Pain Type Surgical pain   Pain Location Knee   Pain Orientation Left   Pain Interventions Repositioned   Cognition   Overall Cognitive Status WFL   Orientation Level Oriented X4   Therapeutic Exercise   Therapeutic Exercise Performed Yes   Therapeutic Exercise Activity 1 AP, GS, AP 2x10   Bed Mobility   Bed Mobility Yes   Bed Mobility 1   Bed Mobility 1 Supine to sitting   Level of Assistance 1 Contact guard;Minimal verbal cues   Bed Mobility Comments 1 HOB elevated. Increased time and effort with multiple stops to complete. Max encouragement to participate   Ambulation/Gait Training   Ambulation/Gait Training Performed Yes   Ambulation/Gait Training 1   Surface 1 Level tile   Device 1 Rolling walker   Gait  Support Devices Gait belt   Assistance 1 Contact guard;Minimal verbal cues   Quality of Gait 1 WBOS;Diminished heel strike;Inconsistent stride length;Antalgic   Comments/Distance (ft) 1 5' to chair with cues for sequencing and WW management   Transfers   Transfer Yes   Transfer 1   Transfer From 1 Bed to   Transfer to 1 Stand   Technique 1 Sit to stand   Transfer Device 1 Walker;Gait belt   Transfer Level of Assistance 1 Contact guard;Moderate verbal cues   Trials/Comments 1 x1 Cues for hand placement and sequencing and avoid pulling up on WW, fair follow thorugh   Transfers 2   Transfer From 2 Sit to;Stand to   Transfer to 2 Chair with arms   Technique 2 Sit to stand;Stand to sit   Transfer Device 2 Walker;Gait belt   Transfer Level of Assistance 2 Contact guard;Close supervision;Minimal verbal cues   Trials/Comments 2 x3 with cues for sequencing and safety, good follow through with repeated cues   Activity Tolerance   Endurance Tolerates 10 - 20 min exercise with multiple rests   PT Assessment   PT Assessment Results Decreased strength;Decreased endurance;Impaired balance;Decreased mobility   Rehab Prognosis Good   Evaluation/Treatment Tolerance Patient limited by pain   End of Session Communication Bedside nurse   Assessment Comment Pt is limited by pain, however, completes activities with encouragement. Pt declines further ambulation this session d/t pain, nurse aware.   End of Session Patient Position Up in chair;Alarm on     Outcome Measures:  [facility] Basic Mobility  Turning from your back to your side while in a flat bed without using bedrails: A little  Moving from lying on your back to sitting on the side of a flat bed without using bedrails: A little  Moving to and from bed to chair (including a wheelchair): A little  Standing up from a chair using your arms (e.g. wheelchair or bedside chair): A little  To walk in hospital room: A little  Climbing 3-5 steps with railing: A lot  Basic Mobility - Total Score:  17                             EDUCATION:     Education Documentation  Handouts, taught by Samantha Banks PTA at 5/1/2025  9:00 AM.  Learner: Patient  Readiness: Acceptance  Method: Explanation, Demonstration  Response: Verbalizes Understanding, Demonstrated Understanding, Needs Reinforcement    Precautions, taught by Samantha Banks PTA at 5/1/2025  9:00 AM.  Learner: Patient  Readiness: Acceptance  Method: Explanation, Demonstration  Response: Verbalizes Understanding, Demonstrated Understanding, Needs Reinforcement    Body Mechanics, taught by Samantha Banks PTA at 5/1/2025  9:00 AM.  Learner: Patient  Readiness: Acceptance  Method: Explanation, Demonstration  Response: Verbalizes Understanding, Demonstrated Understanding, Needs Reinforcement    Home Exercise Program, taught by Samantha Banks PTA at 5/1/2025  9:00 AM.  Learner: Patient  Readiness: Acceptance  Method: Explanation, Demonstration  Response: Verbalizes Understanding, Demonstrated Understanding, Needs Reinforcement    Mobility Training, taught by Samantha Banks PTA at 5/1/2025  9:00 AM.  Learner: Patient  Readiness: Acceptance  Method: Explanation, Demonstration  Response: Verbalizes Understanding, Demonstrated Understanding, Needs Reinforcement    Education Comments  No comments found.        GOALS:  Encounter Problems       Encounter Problems (Active)       PT Problem       Pt will demonstrate mod I for all bed mobility         Start:  04/30/25    Expected End:  05/07/25            Pt will demonstrate mod I for all transfers with WW        Start:  04/30/25    Expected End:  05/07/25            Pt will ambulate 100 ft with WW and mod I.        Start:  04/30/25    Expected End:  05/07/25            Pt will be able to negotiate 4 steps with rail and CGA.        Start:  04/30/25    Expected End:  05/07/25               Pain - Adult

## 2025-05-01 NOTE — PROGRESS NOTES
Physical Therapy    Physical Therapy    Physical Therapy Treatment    Patient Name: Marylou Cordero  MRN: [id number]  Today's Date: 5/1/2025  Time Calculation  Start Time: 1130  Stop Time: 1155  Time Calculation (min): 25 min     4127/4127-A       05/01/25 1130   PT  Visit   PT Received On 05/01/25   Response to Previous Treatment Patient with no complaints from previous session.   General   Reason for Referral impaired mobility s/p L TKA   Referred By Dr. Vaca   Past Medical History Relevant to Rehab Anemia, Anxiety, Arthritis, Coronary artery disease, Depression, DVT (deep venous thrombosis), GERD (gastroesophageal reflux disease), Hypertension, Meningioma, Migraines  Pericardial effusion, Pericarditis, Rheumatoid arthritis, SLE (systemic lupus erythematosus), Smoker   Prior to Session Communication Bedside nurse   Patient Position Received Bed, 2 rail up;Alarm on   Preferred Learning Style kinesthetic;verbal   General Comment Pt agreeable to therapy, cleared for participation   Precautions   LE Weight Bearing Status Weight Bearing as Tolerated   Medical Precautions Fall precautions   Post-Surgical Precautions Left total knee precautions   Pain Assessment   Pain Assessment 0-10   0-10 (Numeric) Pain Score 10 - Worst possible pain   Pain Type Surgical pain   Pain Location Knee   Pain Orientation Left   Pain Interventions Repositioned   Cognition   Overall Cognitive Status WFL   Orientation Level Oriented X4   Bed Mobility   Bed Mobility Yes   Bed Mobility 1   Bed Mobility 1 Supine to sitting   Level of Assistance 1 Contact guard;Minimal verbal cues   Bed Mobility Comments 1 HOB elevated, increased time and effort   Ambulation/Gait Training   Ambulation/Gait Training Performed Yes   Ambulation/Gait Training 1   Surface 1 Level tile   Device 1 Rolling walker   Gait Support Devices Gait belt   Assistance 1 Contact guard;Minimal verbal cues   Quality of Gait 1 WBOS;Diminished heel strike;Inconsistent stride  length;Antalgic   Comments/Distance (ft) 1 5', 35' slow step to gait, flexed posture with cues to facilitate upright stance. Cues to keep WW close for increased safety and stability with poor>fair follow through. Cues for safe hand placement on WW as pt puts hands too far forward and attempts to put forarms onto WW.   Transfers   Transfer Yes   Transfer 1   Transfer From 1 Bed to   Transfer to 1 Stand   Technique 1 Sit to stand   Transfer Device 1 Walker;Gait belt   Transfer Level of Assistance 1 Contact guard;Minimal verbal cues   Transfers 2   Transfer From 2 Chair with arms to   Transfer to 2 Sit;Stand   Technique 2 Sit to stand;Stand to sit   Transfer Device 2 Walker;Gait belt   Transfer Level of Assistance 2 Contact guard;Minimal verbal cues   Trials/Comments 2 x5 cues for technique and sequencing for increased safety. poor>fair follow through increasing fall risk.   Stairs   Stairs Yes   Stairs   Rails 1 Bilateral   Device 1 Railing   Support Devices 1 Gait belt   Assistance 1 Contact guard;Maximum verbal cues  (x2 assist)   Comment/Number of Steps 1 x3 stairs with education and demo prior to trial. Max cues for sequencing and safety throughout. Pt is very vocal d/t pain. Oncreasef time and effort to complete.   Activity Tolerance   Endurance Tolerates 10 - 20 min exercise with multiple rests   PT Assessment   PT Assessment Results Decreased strength;Decreased endurance;Impaired balance;Decreased mobility   Rehab Prognosis Good   Evaluation/Treatment Tolerance Patient limited by pain   End of Session Communication Bedside nurse   Assessment Comment Pt is limited by pain, moaning throughout session. However, pt completes activities with encouragement and mod/max cues for increased safety, poor>fair follow through.   End of Session Patient Position Up in chair;Alarm on     Outcome Measures:  [facility] Basic Mobility  Turning from your back to your side while in a flat bed without using bedrails: A little  Moving  from lying on your back to sitting on the side of a flat bed without using bedrails: A little  Moving to and from bed to chair (including a wheelchair): A little  Standing up from a chair using your arms (e.g. wheelchair or bedside chair): A little  To walk in hospital room: A little  Climbing 3-5 steps with railing: A lot  Basic Mobility - Total Score: 17                             EDUCATION:     Education Documentation  Handouts, taught by Samantha Banks PTA at 5/1/2025 12:04 PM.  Learner: Patient  Readiness: Acceptance  Method: Explanation, Demonstration  Response: Verbalizes Understanding, Needs Reinforcement    Precautions, taught by Samantha Banks PTA at 5/1/2025 12:04 PM.  Learner: Patient  Readiness: Acceptance  Method: Explanation, Demonstration  Response: Verbalizes Understanding, Needs Reinforcement    Body Mechanics, taught by Samantha Banks PTA at 5/1/2025 12:04 PM.  Learner: Patient  Readiness: Acceptance  Method: Explanation, Demonstration  Response: Verbalizes Understanding, Needs Reinforcement    Home Exercise Program, taught by Samantha Banks PTA at 5/1/2025 12:04 PM.  Learner: Patient  Readiness: Acceptance  Method: Explanation, Demonstration  Response: Verbalizes Understanding, Needs Reinforcement    Mobility Training, taught by Samantha Banks PTA at 5/1/2025 12:04 PM.  Learner: Patient  Readiness: Acceptance  Method: Explanation, Demonstration  Response: Verbalizes Understanding, Needs Reinforcement    Handouts, taught by Samantha Banks PTA at 5/1/2025  9:00 AM.  Learner: Patient  Readiness: Acceptance  Method: Explanation, Demonstration  Response: Verbalizes Understanding, Demonstrated Understanding, Needs Reinforcement    Precautions, taught by Samantha Banks PTA at 5/1/2025  9:00 AM.  Learner: Patient  Readiness: Acceptance  Method: Explanation, Demonstration  Response: Verbalizes Understanding, Demonstrated Understanding, Needs Reinforcement    Body Mechanics, taught by Samantha Banks PTA  at 5/1/2025  9:00 AM.  Learner: Patient  Readiness: Acceptance  Method: Explanation, Demonstration  Response: Verbalizes Understanding, Demonstrated Understanding, Needs Reinforcement    Home Exercise Program, taught by Samantha Banks PTA at 5/1/2025  9:00 AM.  Learner: Patient  Readiness: Acceptance  Method: Explanation, Demonstration  Response: Verbalizes Understanding, Demonstrated Understanding, Needs Reinforcement    Mobility Training, taught by Samantha Banks PTA at 5/1/2025  9:00 AM.  Learner: Patient  Readiness: Acceptance  Method: Explanation, Demonstration  Response: Verbalizes Understanding, Demonstrated Understanding, Needs Reinforcement    Education Comments  No comments found.        GOALS:  Encounter Problems       Encounter Problems (Active)       PT Problem       Pt will demonstrate mod I for all bed mobility         Start:  04/30/25    Expected End:  05/07/25            Pt will demonstrate mod I for all transfers with WW        Start:  04/30/25    Expected End:  05/07/25            Pt will ambulate 100 ft with WW and mod I.        Start:  04/30/25    Expected End:  05/07/25            Pt will be able to negotiate 4 steps with rail and CGA.        Start:  04/30/25    Expected End:  05/07/25               Pain - Adult

## 2025-05-01 NOTE — PROGRESS NOTES
"Marylou Cordero is a 65 y.o. female on day 2 of admission presenting with Total knee replacement status, left.    Subjective      The patient is in less pain today  He has mobilized twice with physical therapy    Objective     Physical Exam    Last Recorded Vitals  Blood pressure 137/90, pulse 104, temperature 36.2 °C (97.2 °F), temperature source Temporal, resp. rate 16, height 1.626 m (5' 4\"), weight 97.5 kg (215 lb), SpO2 93%.  Intake/Output last 3 Shifts:  I/O last 3 completed shifts:  In: 2710 (27.8 mL/kg) [P.O.:175; I.V.:1435 (14.7 mL/kg); IV Piggyback:1100]  Out: 3600 (36.9 mL/kg) [Urine:3600 (1 mL/kg/hr)]  Weight: 97.5 kg     Left knee  Dressings intact, clean and dry  Calf muscles are soft  Ankle motion is pain-free  She can flex and extend the knee up to about 40 to 50 degrees  She can straight leg raise      Relevant Results    X-rays of the left knee reviewed early-shows the total knee arthroplasty in good alignment and position    The patient is progressing well, and may be discharged home tomorrow  May continue to take multivitamins and vitamin C  DVT prophylaxis have been discussed    She will follow-up in the orthopedic office in approximate 10 days                                Assessment & Plan  Total knee replacement status, left          I spent 15 minutes in the professional and overall care of this patient.      Rosemarie Vaac MD    "

## 2025-05-02 ENCOUNTER — PHARMACY VISIT (OUTPATIENT)
Dept: PHARMACY | Facility: CLINIC | Age: 65
End: 2025-05-02
Payer: COMMERCIAL

## 2025-05-02 VITALS
OXYGEN SATURATION: 95 % | BODY MASS INDEX: 36.7 KG/M2 | RESPIRATION RATE: 18 BRPM | TEMPERATURE: 98.1 F | HEIGHT: 64 IN | DIASTOLIC BLOOD PRESSURE: 72 MMHG | HEART RATE: 98 BPM | WEIGHT: 215 LBS | SYSTOLIC BLOOD PRESSURE: 110 MMHG

## 2025-05-02 LAB
ANION GAP SERPL CALC-SCNC: 9 MMOL/L (ref 10–20)
ATRIAL RATE: 106 BPM
BUN SERPL-MCNC: 11 MG/DL (ref 6–23)
CALCIUM SERPL-MCNC: 9.2 MG/DL (ref 8.6–10.3)
CHLORIDE SERPL-SCNC: 101 MMOL/L (ref 98–107)
CO2 SERPL-SCNC: 30 MMOL/L (ref 21–32)
CREAT SERPL-MCNC: 0.65 MG/DL (ref 0.5–1.05)
EGFRCR SERPLBLD CKD-EPI 2021: >90 ML/MIN/1.73M*2
GLUCOSE SERPL-MCNC: 117 MG/DL (ref 74–99)
P AXIS: 55 DEGREES
P OFFSET: 181 MS
P ONSET: 121 MS
POTASSIUM SERPL-SCNC: 3.8 MMOL/L (ref 3.5–5.3)
PR INTERVAL: 196 MS
Q ONSET: 219 MS
QRS COUNT: 18 BEATS
QRS DURATION: 74 MS
QT INTERVAL: 338 MS
QTC CALCULATION(BAZETT): 448 MS
QTC FREDERICIA: 408 MS
R AXIS: 4 DEGREES
SODIUM SERPL-SCNC: 136 MMOL/L (ref 136–145)
T AXIS: 48 DEGREES
T OFFSET: 388 MS
VENTRICULAR RATE: 106 BPM

## 2025-05-02 PROCEDURE — 2500000004 HC RX 250 GENERAL PHARMACY W/ HCPCS (ALT 636 FOR OP/ED): Performed by: PHYSICIAN ASSISTANT

## 2025-05-02 PROCEDURE — 2500000001 HC RX 250 WO HCPCS SELF ADMINISTERED DRUGS (ALT 637 FOR MEDICARE OP): Performed by: NURSE PRACTITIONER

## 2025-05-02 PROCEDURE — 96372 THER/PROPH/DIAG INJ SC/IM: CPT | Performed by: PHYSICIAN ASSISTANT

## 2025-05-02 PROCEDURE — 36415 COLL VENOUS BLD VENIPUNCTURE: CPT | Performed by: PHYSICIAN ASSISTANT

## 2025-05-02 PROCEDURE — RXMED WILLOW AMBULATORY MEDICATION CHARGE

## 2025-05-02 PROCEDURE — 80048 BASIC METABOLIC PNL TOTAL CA: CPT | Performed by: PHYSICIAN ASSISTANT

## 2025-05-02 PROCEDURE — 2500000002 HC RX 250 W HCPCS SELF ADMINISTERED DRUGS (ALT 637 FOR MEDICARE OP, ALT 636 FOR OP/ED): Performed by: ORTHOPAEDIC SURGERY

## 2025-05-02 PROCEDURE — 7100000011 HC EXTENDED STAY RECOVERY HOURLY - NURSING UNIT

## 2025-05-02 PROCEDURE — 2500000004 HC RX 250 GENERAL PHARMACY W/ HCPCS (ALT 636 FOR OP/ED): Performed by: ORTHOPAEDIC SURGERY

## 2025-05-02 PROCEDURE — 2500000001 HC RX 250 WO HCPCS SELF ADMINISTERED DRUGS (ALT 637 FOR MEDICARE OP): Performed by: ORTHOPAEDIC SURGERY

## 2025-05-02 PROCEDURE — 97116 GAIT TRAINING THERAPY: CPT | Mod: GP,CQ

## 2025-05-02 PROCEDURE — 97110 THERAPEUTIC EXERCISES: CPT | Mod: GP,CQ

## 2025-05-02 RX ORDER — TRAMADOL HYDROCHLORIDE 50 MG/1
50 TABLET ORAL EVERY 6 HOURS PRN
Qty: 28 TABLET | Refills: 0 | Status: SHIPPED | OUTPATIENT
Start: 2025-05-02 | End: 2025-05-09

## 2025-05-02 RX ADMIN — CHOLECALCIFEROL TAB 125 MCG (5000 UNIT) 125 MCG: 125 TAB at 09:15

## 2025-05-02 RX ADMIN — FOLIC ACID 2 MG: 1 TABLET ORAL at 09:15

## 2025-05-02 RX ADMIN — DOCUSATE SODIUM 100 MG: 100 CAPSULE, LIQUID FILLED ORAL at 09:15

## 2025-05-02 RX ADMIN — CETIRIZINE HYDROCHLORIDE 10 MG: 10 TABLET, FILM COATED ORAL at 09:15

## 2025-05-02 RX ADMIN — POLYETHYLENE GLYCOL 3350 17 G: 17 POWDER, FOR SOLUTION ORAL at 09:14

## 2025-05-02 RX ADMIN — ENOXAPARIN SODIUM 40 MG: 40 INJECTION SUBCUTANEOUS at 09:14

## 2025-05-02 RX ADMIN — Medication 400 MG: at 09:15

## 2025-05-02 RX ADMIN — DULOXETINE HYDROCHLORIDE 30 MG: 30 CAPSULE, DELAYED RELEASE ORAL at 09:15

## 2025-05-02 RX ADMIN — CYCLOBENZAPRINE 10 MG: 10 TABLET, FILM COATED ORAL at 01:09

## 2025-05-02 RX ADMIN — KETOROLAC TROMETHAMINE 15 MG: 15 INJECTION, SOLUTION INTRAMUSCULAR; INTRAVENOUS at 04:50

## 2025-05-02 RX ADMIN — MELOXICAM 15 MG: 15 TABLET ORAL at 09:14

## 2025-05-02 ASSESSMENT — PAIN SCALES - GENERAL
PAINLEVEL_OUTOF10: 7
PAINLEVEL_OUTOF10: 9

## 2025-05-02 ASSESSMENT — PAIN - FUNCTIONAL ASSESSMENT
PAIN_FUNCTIONAL_ASSESSMENT: 0-10
PAIN_FUNCTIONAL_ASSESSMENT: 0-10

## 2025-05-02 ASSESSMENT — PAIN DESCRIPTION - ORIENTATION: ORIENTATION: LEFT

## 2025-05-02 ASSESSMENT — COGNITIVE AND FUNCTIONAL STATUS - GENERAL
WALKING IN HOSPITAL ROOM: A LITTLE
TURNING FROM BACK TO SIDE WHILE IN FLAT BAD: A LITTLE
CLIMB 3 TO 5 STEPS WITH RAILING: A LOT
MOVING TO AND FROM BED TO CHAIR: A LITTLE
MOBILITY SCORE: 19

## 2025-05-02 ASSESSMENT — PAIN DESCRIPTION - LOCATION: LOCATION: KNEE

## 2025-05-02 NOTE — DISCHARGE INSTRUCTIONS
Knee Replacement Instructions  Call Dr. Gilmore for any problems and/or concerns.  *Full weight bearing with walker  *Maintain knee precautions  *No pillow under affected knee  *Raise (elevate) your leg above the level of your heart while you are sitting or lying down (place pillow underneath calf)  *You may shower, do not soak or scrub incision; pat dry  *Change mepilex dressing post-operative day #7 to new dressing  *If you have a polar care cooling device, use as instructed; otherwise  Apply ice to affected knee as needed to minimize swelling, on 20 minutes, off 20 minutes  *Move your toes often to avoid stiffness and to lessen swelling  *Avoid sitting for a long time without moving. Get up to take short walks every 1-2 hours. This is important to improve blood flow and breathing. Ask for help if you feel weak or unsteady  *Continue the coughing and deep breathing exercises that you learned in the hospital     Call Doctor right away for:  *Fever of 100.4 or above, shaking chills  *Stiffness, or inability to move the knee  *Increased swelling in your leg  *Increased redness, tenderness, or swelling in or around the knee incision  *Drainage from the knee incision  *Increased knee pain  *An incision that breaks open  *Chest pain/shortness of breath-call 911     After the procedure you can expect pain, swelling, and limited range of motion     Narcotic pain medication may cause constipation. You may need to take actions to prevent or treat constipation, such as:  -drink enough fluid to keep your urine pale yellow  -take over-the-counter or prescription medicines  -eat foods that are high in fiber, such as beans, whole grains, and fresh fruits and vegetables  -limit foods that are high in fat and processed sugars, such as fried or sweet foods     Take your blood thinner (anticoagulant) as prescribed by your physician to prevent blood clots.   If your physician prescribed MATTHEW Hose (compression stockings)-wear as  instructed as they will help reduce swelling and the formation of blood clots     Do not use any products that contain nicotine or tobacco, such as cigarettes, e-cigarettes, and chewing tobacco. These can delay healing after surgery. If you need help quitting, ask your health care provider     If you were given a knee immobilizer, please take it home with you and keep it handy for at least 4 weeks just in case any issues arise (i.e. knee buckling)

## 2025-05-02 NOTE — CARE PLAN
The patient's goals for the shift include      The clinical goals for the shift include pain management  Pt discharged to home. H/l removed. Meds to beds brought to bedside.

## 2025-05-02 NOTE — DISCHARGE SUMMARY
Discharge summary    Discharge Diagnosis  Total knee replacement status, left      This patient Marylou Cordero was admitted to the hospital on 4/30/2025  after undergoing Procedure(s) (LRB):  LEFT KNEE REPLACEMENT (Left) without complications.    During the postoperative period,while in hospital, patient was medically managed by the hospitalist. Please see medial notes and H&P for patients additional diagnoses.  Ortho agrees with all medical diagnoses and treatments while patient in hospital.  No significant or unexpected findings or abnormal ortho imaging were noted during the hospital stay    Hospital course      Patient tolerated surgical procedure well and there was no complications. Patient progressed adequately through their recovery during hospital stay including PT and rehabilitation.    Patient was then D/C on  to home  in stable condition.  Patient was instructed on the use of pain medications, the signs and symptoms of infection, and was given our number to call should they have any questions or concerns following discharge.    Based on my clinical judgment, the patient was provided with a 7-day prescription for opioid medication at 30 MED, indicated for treatment of acute pain in the setting of recent left TKA. OARRS report was run and has demonstrated an appropriate time course.  The patient has been provided with counseling pertaining to safe use of opioid medication.    Pertinent Physical Exam At Time of Discharge  Alert, oriented x 3, cooperative with examination.     Examination of the left lower  extremity reveals left knee   dressing is intact without drainage.  No izabela-incisional ecchymosis.  EHL, plantarflexion, dorsiflexion are 4+/5.   Sensory intact light touch in the deep/superficial/common peroneal, saphenous, tibial, sural nerve distributions.  DP and popliteal pulses are 2+ with capillary refill less than 2 seconds.  Negative Homans' sign.      Home Medications     Medication List       START taking these medications     acetaminophen 325 mg tablet; Commonly known as: Tylenol; Take 2 tablets   (650 mg) by mouth every 6 hours for 7 days.   cyclobenzaprine 10 mg tablet; Commonly known as: Flexeril; Take 1 tablet   (10 mg) by mouth 3 times a day as needed for muscle spasms for up to 4   days.   docusate sodium 100 mg capsule; Commonly known as: Colace; Take 1   capsule (100 mg) by mouth 2 times a day for 10 days. Stop taking this   medicine if you have diarrhea   ondansetron 4 mg tablet; Commonly known as: Zofran; Take 1 tablet (4 mg)   by mouth every 8 hours if needed for nausea or vomiting.   oxyCODONE 5 mg immediate release tablet; Commonly known as: Roxicodone;   Take 1 tablet (5 mg) by mouth every 6 hours if needed for moderate pain (4   - 6) or severe pain (7 - 10) for up to 7 days.     CONTINUE taking these medications     amLODIPine 10 mg tablet; Commonly known as: Norvasc   cholecalciferol 125 mcg (5,000 units) tablet; Commonly known as: Vitamin   D-3   cromolyn 4 % ophthalmic solution; Commonly known as: Opticrom   diphenhydrAMINE 25 mg capsule; Commonly known as: BENADryl   DULoxetine 30 mg DR capsule; Commonly known as: Cymbalta   folic acid 1 mg tablet; Commonly known as: Folvite   hydrOXYzine HCL 50 mg tablet; Commonly known as: Atarax   loratadine 10 mg tablet; Commonly known as: Claritin   LORazepam 1 mg tablet; Commonly known as: Ativan   lubricating eye drops ophthalmic solution   meloxicam 15 mg tablet; Commonly known as: Mobic   potassium chloride CR 10 mEq ER tablet; Commonly known as: Klor-Con   Pro Fe 180 mg iron capsule; Generic drug: polysaccharide iron complex     STOP taking these medications     chlorhexidine 0.12 % solution; Commonly known as: Peridex           Patient may bear weight as tolerated  to operative extremity with use of walker for assistance with ambulation   Surgical dressing to be removed pod7 and incision left open to air  Eliquis 2.5mg BID for DVT  prophylaxis started on 5/1/2025 and to be taken for 30  days  Follow up with surgeon in 2 weeks    Radiology images XR knee left 1-2 views  Result Date: 4/30/2025  Interpreted By:  Israel Bennett, STUDY: XR KNEE LEFT 1-2 VIEWS;  4/30/2025 11:05 am   INDICATION: Signs/Symptoms:Post-op knee.   COMPARISON: None.   ACCESSION NUMBER(S): [id number]   ORDERING CLINICIAN: NAKUL ROSE   FINDINGS: Left knee arthroplasty components in anatomic alignment.       New left knee arthroplasty in anatomic alignment.   MACRO: None   Signed by: Israel Bennett 4/30/2025 11:16 AM Dictation workstation:   [id number]        Outpatient Follow-Up  No future appointments.      Nettie Mariano PA-C

## 2025-05-02 NOTE — PROGRESS NOTES
05/02/25 1018   Intensity of Service   Intensity of Service 0-30 min     Spoke with patient. Notified [facility] of discharge written for today.

## 2025-05-02 NOTE — PROGRESS NOTES
Physical Therapy    Physical Therapy    Physical Therapy Treatment    Patient Name: Marylou Cordero  MRN: [id number]  Today's Date: 5/2/2025  Time Calculation  Start Time: 0732  Stop Time: 0814  Time Calculation (min): 42 min     4127/4127-A    Assessment/Plan   PT Assessment  PT Assessment Results: Decreased strength, Decreased endurance, Impaired balance, Decreased mobility  Rehab Prognosis: Good  Evaluation/Treatment Tolerance: Patient limited by pain  End of Session Communication: Bedside nurse  End of Session Patient Position: Up in chair, Alarm on  PT Plan  Inpatient/Swing Bed or Outpatient: Inpatient  PT Plan  Treatment/Interventions: Bed mobility, Transfer training, Gait training, Stair training, Balance training, Strengthening, Therapeutic exercise, Therapeutic activity, Endurance training, Home exercise program  PT Plan: Ongoing PT  PT Frequency: BID  PT Discharge Recommendations: Low intensity level of continued care (pending progress)  Equipment Recommended upon Discharge: Wheeled walker  PT Recommended Transfer Status: Assist x1  PT - OK to Discharge: Yes     05/02/25 0732   PT  Visit   PT Received On 05/02/25   Response to Previous Treatment Patient with no complaints from previous session.   General   Reason for Referral TKR   Referred By Dr. Vaca   Past Medical History Relevant to Rehab Anemia, Anxiety, Arthritis, Coronary artery disease, Depression, DVT (deep venous thrombosis), GERD (gastroesophageal reflux disease), Hypertension, Meningioma, Migraines  Pericardial effusion, Pericarditis, Rheumatoid arthritis, SLE (systemic lupus erythematosus), Smoker   Patient Position Received Bed, 3 rail up   Preferred Learning Style verbal;visual   General Comment Pt agreeable to therapy, cleared for participation   Precautions   LE Weight Bearing Status Weight Bearing as Tolerated   Medical Precautions Fall precautions   Post-Surgical Precautions Left total knee precautions   Pain Assessment   Pain  Assessment 0-10   0-10 (Numeric) Pain Score 9  (Patient abilitiies did not match  pain perception)   Pain Type Surgical pain   Pain Location Knee   Pain Orientation Left   Pain Interventions Cold applied   Response to Interventions Decrease in pain   Cognition   Overall Cognitive Status WFL   Orientation Level Oriented X4   Therapeutic Exercise   Therapeutic Exercise Performed Yes   Therapeutic Exercise Activity 1 AP,QS,GS,SLR,HIP ABD, MARCHING,BALL SQUEEZES X 10 B WITH EMPHASIS ON OPERATED LE   Therapeutic Exercise Activity 2 ROM  (Left    7-82)   Bed Mobility   Bed Mobility Yes   Bed Mobility 1   Bed Mobility 1 Supine to sitting   Level of Assistance 1 Close supervision  (Patient using UE to move L operated LE to the edge of the bed)   Ambulation/Gait Training   Ambulation/Gait Training Performed Yes   Ambulation/Gait Training 1   Surface 1 Level tile   Device 1 Rolling walker   Gait Support Devices Gait belt   Assistance 1 Contact guard;Minimal verbal cues   Quality of Gait 1 WBOS;Diminished heel strike;Inconsistent stride length;Antalgic   Comments/Distance (ft) 1 60 x 2  (Patient dmeos slow mainly step to pattern)   Transfers   Transfer Yes   Transfer 1   Technique 1 Sit to stand   Transfer Device 1 Walker;Gait belt   Transfer Level of Assistance 1 Contact guard;Close supervision   Trials/Comments 1 x4   Stairs   Stairs Yes   Stairs   Rails 1 Right   Device 1 Single point cane   Support Devices 1 Gait belt   Assistance 1 Moderate assistance   Comment/Number of Steps 1 3  (Patient needing cues for safety Daughters will be available to assist as needed)   Activity Tolerance   Endurance Tolerates 30 min exercise with multiple rests   PT Assessment   PT Assessment Results Decreased strength;Decreased endurance;Impaired balance;Decreased mobility   Rehab Prognosis Good   Evaluation/Treatment Tolerance Patient limited by pain   End of Session Communication Bedside nurse   End of Session Patient Position Up in  chair;Alarm on   PT Plan   Inpatient/Swing Bed or Outpatient Inpatient     Outcome Measures:  [facility] Basic Mobility  Turning from your back to your side while in a flat bed without using bedrails: None  Moving from lying on your back to sitting on the side of a flat bed without using bedrails: A little  Moving to and from bed to chair (including a wheelchair): A little  Standing up from a chair using your arms (e.g. wheelchair or bedside chair): None  To walk in hospital room: A little  Climbing 3-5 steps with railing: A lot  Basic Mobility - Total Score: 19                             EDUCATION:     Education Documentation  No documentation found.  Education Comments  No comments found.        GOALS:  Encounter Problems       Encounter Problems (Active)       PT Problem       Pt will demonstrate mod I for all bed mobility         Start:  04/30/25    Expected End:  05/07/25            Pt will demonstrate mod I for all transfers with WW        Start:  04/30/25    Expected End:  05/07/25            Pt will ambulate 100 ft with WW and mod I.        Start:  04/30/25    Expected End:  05/07/25            Pt will be able to negotiate 4 steps with rail and CGA.        Start:  04/30/25    Expected End:  05/07/25               Pain - Adult

## 2025-05-07 LAB
ATRIAL RATE: 106 BPM
P AXIS: 55 DEGREES
P OFFSET: 181 MS
P ONSET: 121 MS
PR INTERVAL: 196 MS
Q ONSET: 219 MS
QRS COUNT: 18 BEATS
QRS DURATION: 74 MS
QT INTERVAL: 338 MS
QTC CALCULATION(BAZETT): 448 MS
QTC FREDERICIA: 408 MS
R AXIS: 4 DEGREES
T AXIS: 48 DEGREES
T OFFSET: 388 MS
VENTRICULAR RATE: 106 BPM

## (undated) DEVICE — GLOVE, SURGICAL, PROTEXIS PI MICRO, 8.0, PF, LF

## (undated) DEVICE — SUTURE, STARTAFIX, SYMMETRIC, PDS PLUS, 60CM CT-1

## (undated) DEVICE — BANDAGE, COFLEX, 6 X 5 YDS, FOAM TAN, STERILE, LF

## (undated) DEVICE — PAD, KNEE PATIENT, DEMAYO, DISP, STERILE

## (undated) DEVICE — BANDAGE, COMPRESSION, W/CLIP, FLEX-MASTER, DOUBLE LENGTH, 6 IN X 11 YD, LF

## (undated) DEVICE — VEST, SURGEON, PRECEPT, LF

## (undated) DEVICE — PIN, HEADLESS 1/8 X 3.5 FLUTE 4/PK STERILE

## (undated) DEVICE — NEEDLE, SPINAL, QUINCKE, 18 G X 3.5 IN, PINK HUB

## (undated) DEVICE — MIXER, CEMENT, MIXEVAC III HIGH VACUUM KIT, STERILE

## (undated) DEVICE — BLADE, SAW, SAGITTAL, 25.0 X 1.27 X 90MM

## (undated) DEVICE — Device

## (undated) DEVICE — SOLUTION, IRRIGATION, SODIUM CHLORIDE 0.9%, 1000 ML, POUR BOTTLE

## (undated) DEVICE — GLOVE, SURGICAL, PROTEXIS PI W/NEU-THERA, 8.0, PF, LF

## (undated) DEVICE — DRAPE, INSTRUMENT, W/POUCH, STERI DRAPE, 7 X 11 IN, DISPOSABLE, STERILE

## (undated) DEVICE — SUTURE, VICRYL, 1, 36 IN, CT-1, UNDYED

## (undated) DEVICE — TOWEL PACK, STERILE, 4/PACK, BLUE

## (undated) DEVICE — SYRINGE, 60 CC, LUER LOCK, MONOJECT, W/O CAP, LF

## (undated) DEVICE — INTERPULSE HANDPIECE SET W/ 10FT SUCTION TUBING

## (undated) DEVICE — IRRIGATION SYSTEM, WOUND, SURGIPHOR, 450ML, STERILE

## (undated) DEVICE — CUFF, TOURNIQUET, DUAL PORT, SNG BLADDER, 34 IN, PLC

## (undated) DEVICE — SOLUTION, IRRIGATION, STERILE WATER, 1000 ML, POUR BOTTLE

## (undated) DEVICE — SUTURE, VICRYL 2-0, TAPER POINT, CT-1 UNDYED 27 INCH

## (undated) DEVICE — CLOSURE SYSTEM, DERMABOND, PRINEO, 22CM, STERILE

## (undated) DEVICE — SUTURE, STRATAFIX, 3-0, SPIRAL MONOCRYL PLUS, PS, 70CM, UNDYED

## (undated) DEVICE — DRESSING, MEPILEX BORDER, POST-OP AG, 4 X 12 IN

## (undated) DEVICE — GOWN, SURGICAL, NON-REINFORCED, XLARGE, LF

## (undated) DEVICE — HOOD, STERISHIELD T4 SYSTEM

## (undated) DEVICE — BLADE, SAGITTAL

## (undated) DEVICE — HIGH FLOW TIP FOR INTERPULSE HANDPIECE SET

## (undated) DEVICE — SUTURE, VICRYL, 0, 36 IN, CT-1, UNDYED

## (undated) DEVICE — SUTURE, ETHIBOND, P2, V-37, 30 IN, GREEN